# Patient Record
Sex: MALE | Race: WHITE | Employment: UNEMPLOYED | URBAN - NONMETROPOLITAN AREA
[De-identification: names, ages, dates, MRNs, and addresses within clinical notes are randomized per-mention and may not be internally consistent; named-entity substitution may affect disease eponyms.]

---

## 2017-10-31 ENCOUNTER — OFFICE VISIT (OUTPATIENT)
Dept: FAMILY MEDICINE CLINIC | Age: 37
End: 2017-10-31
Payer: MEDICAID

## 2017-10-31 VITALS
HEART RATE: 100 BPM | WEIGHT: 167 LBS | SYSTOLIC BLOOD PRESSURE: 124 MMHG | DIASTOLIC BLOOD PRESSURE: 68 MMHG | BODY MASS INDEX: 24.73 KG/M2 | HEIGHT: 69 IN

## 2017-10-31 DIAGNOSIS — Z72.0 TOBACCO USE: ICD-10-CM

## 2017-10-31 DIAGNOSIS — G43.909 MIGRAINE WITHOUT STATUS MIGRAINOSUS, NOT INTRACTABLE, UNSPECIFIED MIGRAINE TYPE: ICD-10-CM

## 2017-10-31 DIAGNOSIS — Z13.220 SCREENING, LIPID: ICD-10-CM

## 2017-10-31 DIAGNOSIS — R63.4 WEIGHT LOSS, NON-INTENTIONAL: ICD-10-CM

## 2017-10-31 DIAGNOSIS — Z23 NEED FOR PROPHYLACTIC VACCINATION AGAINST STREPTOCOCCUS PNEUMONIAE (PNEUMOCOCCUS): ICD-10-CM

## 2017-10-31 DIAGNOSIS — Z23 NEED FOR PROPHYLACTIC VACCINATION AGAINST DIPHTHERIA-TETANUS-PERTUSSIS (DTP): ICD-10-CM

## 2017-10-31 DIAGNOSIS — F41.9 ANXIETY: Primary | ICD-10-CM

## 2017-10-31 DIAGNOSIS — I10 ESSENTIAL HYPERTENSION: ICD-10-CM

## 2017-10-31 PROCEDURE — 90471 IMMUNIZATION ADMIN: CPT | Performed by: FAMILY MEDICINE

## 2017-10-31 PROCEDURE — 4004F PT TOBACCO SCREEN RCVD TLK: CPT | Performed by: FAMILY MEDICINE

## 2017-10-31 PROCEDURE — G8420 CALC BMI NORM PARAMETERS: HCPCS | Performed by: FAMILY MEDICINE

## 2017-10-31 PROCEDURE — G8427 DOCREV CUR MEDS BY ELIG CLIN: HCPCS | Performed by: FAMILY MEDICINE

## 2017-10-31 PROCEDURE — 90686 IIV4 VACC NO PRSV 0.5 ML IM: CPT | Performed by: FAMILY MEDICINE

## 2017-10-31 PROCEDURE — 99204 OFFICE O/P NEW MOD 45 MIN: CPT | Performed by: FAMILY MEDICINE

## 2017-10-31 PROCEDURE — G8484 FLU IMMUNIZE NO ADMIN: HCPCS | Performed by: FAMILY MEDICINE

## 2017-10-31 RX ORDER — PROPRANOLOL HYDROCHLORIDE 20 MG/1
20 TABLET ORAL 3 TIMES DAILY
COMMUNITY
End: 2017-10-31 | Stop reason: SDUPTHER

## 2017-10-31 RX ORDER — BUSPIRONE HYDROCHLORIDE 15 MG/1
15 TABLET ORAL 2 TIMES DAILY
Qty: 60 TABLET | Refills: 1 | Status: SHIPPED | OUTPATIENT
Start: 2017-10-31 | End: 2017-11-15 | Stop reason: SDUPTHER

## 2017-10-31 RX ORDER — LORAZEPAM 1 MG/1
0.5 TABLET ORAL 3 TIMES DAILY PRN
Qty: 45 TABLET | Refills: 0 | Status: SHIPPED | OUTPATIENT
Start: 2017-10-31 | End: 2017-11-15 | Stop reason: SDUPTHER

## 2017-10-31 RX ORDER — PROPRANOLOL HYDROCHLORIDE 40 MG/1
40 TABLET ORAL 3 TIMES DAILY
Qty: 90 TABLET | Refills: 1 | Status: SHIPPED | OUTPATIENT
Start: 2017-10-31 | End: 2018-01-10 | Stop reason: SDUPTHER

## 2017-10-31 RX ORDER — LORAZEPAM 0.5 MG/1
1 TABLET ORAL
COMMUNITY
End: 2017-10-31 | Stop reason: SDUPTHER

## 2017-10-31 RX ORDER — OMEPRAZOLE 40 MG/1
40 CAPSULE, DELAYED RELEASE ORAL
COMMUNITY
Start: 2016-11-02 | End: 2017-11-15 | Stop reason: ALTCHOICE

## 2017-10-31 RX ORDER — MIRTAZAPINE 15 MG/1
15 TABLET, FILM COATED ORAL NIGHTLY
Qty: 30 TABLET | Refills: 1 | Status: SHIPPED | OUTPATIENT
Start: 2017-10-31 | End: 2017-12-13 | Stop reason: SDUPTHER

## 2017-10-31 ASSESSMENT — ENCOUNTER SYMPTOMS: SHORTNESS OF BREATH: 0

## 2017-10-31 NOTE — PROGRESS NOTES
tablet 0    mirtazapine (REMERON) 15 MG tablet Take 1 tablet by mouth nightly 30 tablet 1    Suvorexant (BELSOMRA) 10 MG TABS Take 10 mg by mouth nightly 30 tablet 1    busPIRone (BUSPAR) 15 MG tablet Take 1 tablet by mouth 2 times daily 60 tablet 1    propranolol (INDERAL) 40 MG tablet Take 1 tablet by mouth 3 times daily 90 tablet 1    omeprazole (PRILOSEC) 40 MG delayed release capsule Take 40 mg by mouth      oxyCODONE-acetaminophen (PERCOCET) 5-325 MG per tablet Take 1 tablet by mouth every 4 hours as needed.  dicyclomine (BENTYL) 10 MG capsule Take 10 mg by mouth 4 times daily (before meals and nightly).  omeprazole (PRILOSEC) 20 MG capsule Take 20 mg by mouth daily. No current facility-administered medications for this visit. Allergies   Allergen Reactions    Ciprofloxacin        Health Maintenance   Topic Date Due    HIV screen  09/27/1995    DTaP/Tdap/Td vaccine (1 - Tdap) 09/27/1999    Pneumococcal med risk (1 of 1 - PPSV23) 09/27/1999    Flu vaccine  Completed       Subjective:      Review of Systems   Constitutional: Positive for unexpected weight change (15 # past few months). Negative for fatigue. Here to establish as new patient. Former physician was Dr. Hamamd Lopez. Has personal and family history of anxiety and depression. Family members also have cardiac issues which is also of concern to him at this time. Respiratory: Negative for shortness of breath. Cardiovascular: Positive for palpitations. Negative for chest pain and leg swelling. Genitourinary: Negative for frequency. Neurological: Negative for dizziness. Psychiatric/Behavioral: Positive for sleep disturbance and suicidal ideas (Not currently but in the past he has had thoughts, never attempts). Negative for hallucinations. The patient is nervous/anxious. racing thoughts when going to sleep.    Nocturia noted more recently    Objective:     /68   Pulse 100   Ht 5' 9\" (1.753 m)   Wt 167 lb (75.8 kg)   BMI 24.66 kg/m²     Physical Exam   Constitutional: He is oriented to person, place, and time. He appears well-developed and well-nourished. No distress. HENT:   Head: Atraumatic. Eyes: Conjunctivae are normal.   Neck: Neck supple. Carotid bruit is not present. No thyromegaly present. Cardiovascular: Regular rhythm. Tachycardia present. No murmur heard. Pulmonary/Chest: Effort normal and breath sounds normal.   Musculoskeletal: He exhibits no edema. Right lower leg: He exhibits no swelling. Left lower leg: He exhibits no swelling. Lymphadenopathy:     He has no cervical adenopathy. Neurological: He is alert and oriented to person, place, and time. Vitals reviewed. extensive counselling on proper medications and options for treatment- pt open to review. Over 45 min spent with over 50% in counselling. Controlled Substances Monitoring:     Attestation: The Prescription Monitoring Report for this patient was reviewed today. (Inés Marie MD)  Documentation: No signs of potential drug abuse or diversion identified. (Inés Marie MD)      Assessment:     1. Anxiety    2. Need for prophylactic vaccination against Streptococcus pneumoniae (pneumococcus)    3. Need for prophylactic vaccination against diphtheria-tetanus-pertussis (DTP)    4. Tobacco use    5. Weight loss, non-intentional    6. Migraine without status migrainosus, not intractable, unspecified migraine type    7. Essential hypertension    8. Screening, lipid      No results found for this visit on 10/31/17.         Plan:     Orders Placed This Encounter   Procedures    INFLUENZA, QUADV, 3 YRS AND OLDER, IM, PF, PREFILL SYR OR SDV, 0.5ML (FLUZONE QUADV, PF)    Creatinine, Serum     Standing Status:   Future     Standing Expiration Date:   10/31/2018    Electrolyte Panel     Standing Status:   Future     Standing Expiration Date:   10/31/2018    Lipid Panel     Standing Status:   Future     Standing Expiration Date:   10/31/2018     Order Specific Question:   Is Patient Fasting?/# of Hours     Answer:   10-12    Microalbumin, Ur     Standing Status:   Future     Standing Expiration Date:   10/31/2018    TSH without Reflex     Standing Status:   Future     Standing Expiration Date:   10/31/2018       Orders Placed This Encounter   Medications    LORazepam (ATIVAN) 1 MG tablet     Sig: Take 0.5 tablets by mouth 3 times daily as needed for Anxiety     Dispense:  45 tablet     Refill:  0    mirtazapine (REMERON) 15 MG tablet     Sig: Take 1 tablet by mouth nightly     Dispense:  30 tablet     Refill:  1    Suvorexant (BELSOMRA) 10 MG TABS     Sig: Take 10 mg by mouth nightly     Dispense:  30 tablet     Refill:  1    busPIRone (BUSPAR) 15 MG tablet     Sig: Take 1 tablet by mouth 2 times daily     Dispense:  60 tablet     Refill:  1    propranolol (INDERAL) 40 MG tablet     Sig: Take 1 tablet by mouth 3 times daily     Dispense:  90 tablet     Refill:  1      Return in about 4 weeks (around 11/28/2017). Discussed use, benefit, and side effects of prescribed medications. All patient questions answered. Pt voiced understanding. Reviewed health maintenance. Instructed to continue current medications, diet and exercise. Patient agreed with treatment plan. Follow up as directed.      Reviewed with pt plan to taper from benzodiazepine scheduled to limited use to symptomatic    Electronically signed by Princess Villatoro MD on 10/31/2017

## 2017-11-15 ENCOUNTER — OFFICE VISIT (OUTPATIENT)
Dept: FAMILY MEDICINE CLINIC | Age: 37
End: 2017-11-15
Payer: MEDICAID

## 2017-11-15 VITALS
SYSTOLIC BLOOD PRESSURE: 120 MMHG | WEIGHT: 171 LBS | BODY MASS INDEX: 25.25 KG/M2 | HEART RATE: 100 BPM | DIASTOLIC BLOOD PRESSURE: 70 MMHG

## 2017-11-15 DIAGNOSIS — Z72.0 TOBACCO USE: ICD-10-CM

## 2017-11-15 DIAGNOSIS — I10 ESSENTIAL HYPERTENSION: ICD-10-CM

## 2017-11-15 DIAGNOSIS — G47.00 INSOMNIA, UNSPECIFIED TYPE: ICD-10-CM

## 2017-11-15 DIAGNOSIS — F41.9 ANXIETY: Primary | ICD-10-CM

## 2017-11-15 PROCEDURE — 99214 OFFICE O/P EST MOD 30 MIN: CPT | Performed by: FAMILY MEDICINE

## 2017-11-15 RX ORDER — LORAZEPAM 1 MG/1
0.5 TABLET ORAL EVERY 6 HOURS PRN
Qty: 60 TABLET | Refills: 0 | Status: SHIPPED | OUTPATIENT
Start: 2017-11-15 | End: 2017-11-22 | Stop reason: SDUPTHER

## 2017-11-15 RX ORDER — ESZOPICLONE 1 MG/1
1 TABLET, FILM COATED ORAL NIGHTLY
Qty: 30 TABLET | Refills: 0 | Status: SHIPPED | OUTPATIENT
Start: 2017-11-15 | End: 2017-12-13 | Stop reason: SDUPTHER

## 2017-11-15 RX ORDER — BUSPIRONE HYDROCHLORIDE 30 MG/1
30 TABLET ORAL 2 TIMES DAILY
Qty: 60 TABLET | Refills: 2 | Status: SHIPPED | OUTPATIENT
Start: 2017-11-15 | End: 2017-12-13

## 2017-11-15 NOTE — PATIENT INSTRUCTIONS
Hot compresses to nose 10 minutes 3 times daily. Avoid irritating or picking at nose lesion for at least 2 weeks.

## 2017-11-15 NOTE — PROGRESS NOTES
[facility]  [address]  Dept: [phone]  Dept Fax: [phone]      Jonathan Redman is a 40 y.o. male who presents today for his medical conditions/complaints as noted below. Jonathan Redman is c/o of Anxiety            HPI:     HPI   Pt back for follow up,   Was unable to get belsomra through insurance. Remeron and medications were doing ok though when attempting to stop ativan noted sweats and not feeling as well. Had been on 1 mg 3 times daily then dropped to 1/2 3 times daily and noting need for some extra doses  Not noting improvement with buspar per pt. Also complaints of right nostril irritated for past couple weeks  Had prior taken Diane Lanes - stopped when children smaller.      No abdominal pains noted just intermittant \"nervous stomach\"    BP Readings from Last 3 Encounters:   11/15/17 120/70   10/31/17 124/68   08/14/15 125/83          (goal 120/80)    Past Medical History:   Diagnosis Date    Anxiety     Has been hospitalized in past    Chronic back pain     Depression     Has been hospitalized in past    Irritable bowel syndrome       Past Surgical History:   Procedure Laterality Date    APPENDECTOMY      HERNIA REPAIR      TONSILLECTOMY         Family History   Problem Relation Age of Onset    Other Mother      Anxiety/Depression    Heart Attack Father     Heart Attack Maternal Uncle     Heart Attack Paternal Uncle     Other Maternal Uncle      Suicide    Other Paternal Uncle      Suicide       Social History   Substance Use Topics    Smoking status: Current Every Day Smoker     Packs/day: 1.50     Types: Cigarettes    Smokeless tobacco: Former User     Types: Chew    Alcohol use Yes      Comment: socailly      Current Outpatient Prescriptions   Medication Sig Dispense Refill    busPIRone (BUSPAR) 30 MG tablet Take 1 tablet by mouth 2 times daily 60 tablet 2    eszopiclone (LUNESTA) 1 MG TABS Take 1 tablet by mouth nightly . 30 tablet 0    LORazepam (ATIVAN) 1 MG tablet Take 0.5 tablets by mouth every 6 hours as needed for Anxiety . 60 tablet 0    mirtazapine (REMERON) 15 MG tablet Take 1 tablet by mouth nightly 30 tablet 1    propranolol (INDERAL) 40 MG tablet Take 1 tablet by mouth 3 times daily 90 tablet 1     No current facility-administered medications for this visit. Allergies   Allergen Reactions    Ciprofloxacin        Health Maintenance   Topic Date Due    HIV screen  09/27/1995    DTaP/Tdap/Td vaccine (1 - Tdap) 09/27/1999    Pneumococcal med risk (1 of 1 - PPSV23) 09/27/1999    Flu vaccine  Completed       Subjective:      Review of Systems   Constitutional: Positive for activity change (decreasing) and appetite change (decreasing). Negative for unexpected weight change. Psychiatric/Behavioral: Positive for decreased concentration and sleep disturbance (insurance didn't cover Belsomra,issues falling and staying asleep. Remrom was helpful to sleep until Ativan dose was decreased). The patient is nervous/anxious (alot of panic attacks). Mood:  Anxious, buspar not helping much, ativan lower dose isn't helping  Suicidal thoughts? no  Previous Psychiatrist/Counseling? no    Objective:     /70   Pulse 100   Wt 171 lb (77.6 kg)   BMI 25.25 kg/m²   Physical Exam   Constitutional: He is oriented to person, place, and time. HENT:   Nose: No mucosal edema. Right nare with 3 mm mild hyperkeratosis anteriorly inner aspect without bleeding   Cardiovascular: Normal rate and regular rhythm. No murmur heard. Pulmonary/Chest: Effort normal and breath sounds normal. He has no wheezes. Neurological: He is alert and oriented to person, place, and time. Psychiatric: His speech is normal. His mood appears anxious (mildly with good eye contact). Assessment:      1. Anxiety    2. Insomnia, unspecified type    3. Tobacco use    4.  Essential hypertension Plan:     Patient Instructions   Hot compresses to nose 10 minutes 3 times daily. Avoid irritating or picking at nose lesion for at least 2 weeks. No orders of the defined types were placed in this encounter. Orders Placed This Encounter   Medications    busPIRone (BUSPAR) 30 MG tablet     Sig: Take 1 tablet by mouth 2 times daily     Dispense:  60 tablet     Refill:  2    eszopiclone (LUNESTA) 1 MG TABS     Sig: Take 1 tablet by mouth nightly . Dispense:  30 tablet     Refill:  0    LORazepam (ATIVAN) 1 MG tablet     Sig: Take 0.5 tablets by mouth every 6 hours as needed for Anxiety . Dispense:  60 tablet     Refill:  0    Reviewed if not able to improve and control well may consider return to psychiatry if desired. Return in about 4 weeks (around 12/13/2017) for anxiety, insomnia may push back prior appointment. Reviewed bumping busparone and edge up ativan to 4 times daily          Discussed use, benefit, and side effects of prescribed medications. All patient questions answered. Pt voiced understanding. Reviewed health maintenance. Instructed to continue current medications, diet and exercise. Patient agreed with treatment plan. Follow up as directed.      Electronically signed by Quentin Germain MD on 11/15/2017

## 2017-11-22 RX ORDER — LORAZEPAM 1 MG/1
0.5 TABLET ORAL EVERY 6 HOURS PRN
Qty: 60 TABLET | Refills: 0 | Status: SHIPPED | OUTPATIENT
Start: 2017-11-22 | End: 2017-12-13 | Stop reason: SDUPTHER

## 2017-12-09 ENCOUNTER — HOSPITAL ENCOUNTER (EMERGENCY)
Age: 37
Discharge: HOME | End: 2017-12-09
Attending: EMERGENCY MEDICINE
Payer: MEDICARE

## 2017-12-09 VITALS
OXYGEN SATURATION: 100 % | SYSTOLIC BLOOD PRESSURE: 132 MMHG | BODY MASS INDEX: 25.2 KG/M2 | HEART RATE: 80 BPM | DIASTOLIC BLOOD PRESSURE: 80 MMHG | WEIGHT: 180 LBS | HEIGHT: 71 IN | TEMPERATURE: 98.1 F | RESPIRATION RATE: 12 BRPM

## 2017-12-09 DIAGNOSIS — K08.89 PAIN, DENTAL: Primary | ICD-10-CM

## 2017-12-09 PROCEDURE — 2500000003 HC RX 250 WO HCPCS

## 2017-12-09 PROCEDURE — 99282 EMERGENCY DEPT VISIT SF MDM: CPT

## 2017-12-09 PROCEDURE — 6370000000 HC RX 637 (ALT 250 FOR IP): Performed by: EMERGENCY MEDICINE

## 2017-12-09 PROCEDURE — 64400 NJX AA&/STRD TRIGEMINAL NRV: CPT

## 2017-12-09 RX ORDER — BUPIVACAINE HYDROCHLORIDE AND EPINEPHRINE 5; 5 MG/ML; UG/ML
10 INJECTION, SOLUTION PERINEURAL ONCE
Status: DISCONTINUED | OUTPATIENT
Start: 2017-12-09 | End: 2017-12-10 | Stop reason: HOSPADM

## 2017-12-09 RX ORDER — PENICILLIN V POTASSIUM 250 MG/1
500 TABLET ORAL ONCE
Status: COMPLETED | OUTPATIENT
Start: 2017-12-09 | End: 2017-12-09

## 2017-12-09 RX ORDER — BUPIVACAINE HYDROCHLORIDE AND EPINEPHRINE 5; 5 MG/ML; UG/ML
INJECTION, SOLUTION EPIDURAL; INTRACAUDAL; PERINEURAL
Status: COMPLETED
Start: 2017-12-09 | End: 2017-12-09

## 2017-12-09 RX ORDER — IBUPROFEN 600 MG/1
600 TABLET ORAL EVERY 6 HOURS PRN
Qty: 30 TABLET | Refills: 0 | Status: SHIPPED | OUTPATIENT
Start: 2017-12-09 | End: 2021-10-18

## 2017-12-09 RX ORDER — PENICILLIN V POTASSIUM 500 MG/1
500 TABLET ORAL 4 TIMES DAILY
Qty: 28 TABLET | Refills: 0 | Status: SHIPPED | OUTPATIENT
Start: 2017-12-09 | End: 2017-12-16

## 2017-12-09 RX ORDER — IBUPROFEN 800 MG/1
800 TABLET ORAL ONCE
Status: COMPLETED | OUTPATIENT
Start: 2017-12-09 | End: 2017-12-09

## 2017-12-09 RX ADMIN — BUPIVACAINE HYDROCHLORIDE AND EPINEPHRINE BITARTRATE 30 ML: 5; .005 INJECTION, SOLUTION EPIDURAL; INTRACAUDAL; PERINEURAL at 22:28

## 2017-12-09 RX ADMIN — PENICILLIN V POTASIUM 500 MG: 250 TABLET ORAL at 22:25

## 2017-12-09 RX ADMIN — IBUPROFEN 800 MG: 800 TABLET, FILM COATED ORAL at 22:25

## 2017-12-09 ASSESSMENT — PAIN DESCRIPTION - LOCATION: LOCATION: MOUTH

## 2017-12-09 ASSESSMENT — PAIN DESCRIPTION - PAIN TYPE: TYPE: ACUTE PAIN

## 2017-12-09 ASSESSMENT — PAIN SCALES - GENERAL
PAINLEVEL_OUTOF10: 9
PAINLEVEL_OUTOF10: 9
PAINLEVEL_OUTOF10: 10

## 2017-12-10 NOTE — ED PROVIDER NOTES
paternal uncle. SOCIAL HISTORY      reports that he has been smoking Cigarettes. He has been smoking about 0.50 packs per day. He has quit using smokeless tobacco. His smokeless tobacco use included Chew. He reports that he does not drink alcohol or use drugs. PHYSICAL EXAM     INITIAL VITALS:  height is 5' 11\" (1.803 m) and weight is 180 lb (81.6 kg). His tympanic temperature is 98.1 °F (36.7 °C). His pulse is 80. His respiration is 12 and oxygen saturation is 100%. Gen.: Patient is alert. No apparent distress. HEENT: Head is atraumatic. Conjunctiva are clear. Face shows no erythema or warmth or swelling. Mouth shows moist mucous membranes. Patient has several caps to the area. Has several areas worse. Teeth are gone either surgically or because of chronic problems. He has 2 areas, one on the top upper left is decayed down to the gumline, which is what he said was fractured today. Has another area on the right side, which is also decayed down to the gumline has no swelling to the area. No trismus. Neck: Supple. No meningismus. Respiratory: Lung sounds are clear bilateral without wheezes or rhonchi.   Cardiac: Heart is regular rate and rhythm    DIFFERENTIAL DIAGNOSIS/ MDM:     Dental pain, cough    DIAGNOSTIC RESULTS         EMERGENCY DEPARTMENT COURSE:   Vitals:    Vitals:    12/09/17 2152   Pulse: 80   Resp: 12   Temp: 98.1 °F (36.7 °C)   TempSrc: Tympanic   SpO2: 100%   Weight: 180 lb (81.6 kg)   Height: 5' 11\" (1.803 m)     -------------------------   , Temp: 98.1 °F (36.7 °C), Pulse: 80, Resp: 12    Orders Placed This Encounter   Medications    bupivacaine-EPINEPHrine (MARCAINE-w/EPINEPHRINE) 0.5% -1:730099 injection 10 mL    bupivacaine-EPINEPHrine PF (MARCAINE-w/EPINEPHRINE) 0.5% -1:066050 injection     CARRINGTON VASQUEZ: cabinet override    penicillin v potassium (VEETID) 500 MG tablet     Sig: Take 1 tablet by mouth 4 times daily for 7 days     Dispense:  28 tablet     Refill: 0    penicillin v potassium (VEETID) tablet 500 mg    ibuprofen (ADVIL;MOTRIN) 600 MG tablet     Sig: Take 1 tablet by mouth every 6 hours as needed for Pain     Dispense:  30 tablet     Refill:  0    ibuprofen (ADVIL;MOTRIN) tablet 800 mg           Re-evaluation Notes    Patient is requesting numbing medicine to the area. 1/2 mL of 1/2% Marcaine with epinephrine was instilled around the base of the tooth. He will be discharged with prescriptions for penicillin, Motrin, follow-up with his dentist return if worse    CRITICAL CARE:   None      CONSULTS:      PROCEDURES:  None    FINAL IMPRESSION      1. Pain, dental          DISPOSITION/PLAN   DISPOSITION Decision to Discharge    Condition on Disposition    Stable    PATIENT REFERRED TO:  No follow-up provider specified. DISCHARGE MEDICATIONS:  New Prescriptions    IBUPROFEN (ADVIL;MOTRIN) 600 MG TABLET    Take 1 tablet by mouth every 6 hours as needed for Pain    PENICILLIN V POTASSIUM (VEETID) 500 MG TABLET    Take 1 tablet by mouth 4 times daily for 7 days       (Please note that portions of this note were completed with a voice recognition program.  Efforts were made to edit the dictations but occasionally words are mis-transcribed.)    Wood MD, F.A.C.E.P.   Attending Emergency Physician        Fely Deluca MD  12/09/17 [id number]

## 2017-12-11 ENCOUNTER — TELEPHONE (OUTPATIENT)
Dept: FAMILY MEDICINE CLINIC | Age: 37
End: 2017-12-11

## 2017-12-13 ENCOUNTER — OFFICE VISIT (OUTPATIENT)
Dept: FAMILY MEDICINE CLINIC | Age: 37
End: 2017-12-13
Payer: MEDICARE

## 2017-12-13 VITALS
HEART RATE: 100 BPM | BODY MASS INDEX: 23.43 KG/M2 | WEIGHT: 168 LBS | DIASTOLIC BLOOD PRESSURE: 64 MMHG | SYSTOLIC BLOOD PRESSURE: 110 MMHG

## 2017-12-13 DIAGNOSIS — Z72.0 TOBACCO USE: ICD-10-CM

## 2017-12-13 DIAGNOSIS — F41.9 ANXIETY: Primary | ICD-10-CM

## 2017-12-13 DIAGNOSIS — G47.00 INSOMNIA, UNSPECIFIED TYPE: ICD-10-CM

## 2017-12-13 DIAGNOSIS — I10 ESSENTIAL HYPERTENSION: ICD-10-CM

## 2017-12-13 PROCEDURE — G8427 DOCREV CUR MEDS BY ELIG CLIN: HCPCS | Performed by: FAMILY MEDICINE

## 2017-12-13 PROCEDURE — G8420 CALC BMI NORM PARAMETERS: HCPCS | Performed by: FAMILY MEDICINE

## 2017-12-13 PROCEDURE — 4004F PT TOBACCO SCREEN RCVD TLK: CPT | Performed by: FAMILY MEDICINE

## 2017-12-13 PROCEDURE — G8484 FLU IMMUNIZE NO ADMIN: HCPCS | Performed by: FAMILY MEDICINE

## 2017-12-13 PROCEDURE — 99214 OFFICE O/P EST MOD 30 MIN: CPT | Performed by: FAMILY MEDICINE

## 2017-12-13 RX ORDER — ESZOPICLONE 1 MG/1
1 TABLET, FILM COATED ORAL NIGHTLY
Qty: 30 TABLET | Refills: 1 | Status: SHIPPED | OUTPATIENT
Start: 2017-12-13 | End: 2018-02-13 | Stop reason: SDUPTHER

## 2017-12-13 RX ORDER — MIRTAZAPINE 30 MG/1
30 TABLET, FILM COATED ORAL NIGHTLY
Qty: 30 TABLET | Refills: 3 | Status: SHIPPED | OUTPATIENT
Start: 2017-12-13 | End: 2018-02-12 | Stop reason: SDUPTHER

## 2017-12-13 RX ORDER — LORAZEPAM 1 MG/1
1 TABLET ORAL EVERY 8 HOURS PRN
Qty: 90 TABLET | Refills: 0 | Status: SHIPPED | OUTPATIENT
Start: 2017-12-13 | End: 2018-01-04 | Stop reason: SDUPTHER

## 2017-12-13 NOTE — TELEPHONE ENCOUNTER
Reviewed at follow up appointment today.  Will not alternate benzo scripts with pt should be decreasing use

## 2018-01-04 DIAGNOSIS — F41.9 ANXIETY: Primary | ICD-10-CM

## 2018-01-04 RX ORDER — LORAZEPAM 1 MG/1
1 TABLET ORAL EVERY 8 HOURS PRN
Qty: 90 TABLET | Refills: 0 | Status: SHIPPED | OUTPATIENT
Start: 2018-01-04 | End: 2018-02-03

## 2018-01-04 RX ORDER — LORAZEPAM 1 MG/1
1 TABLET ORAL EVERY 8 HOURS PRN
Qty: 90 TABLET | Refills: 0 | OUTPATIENT
Start: 2018-01-04

## 2018-01-10 RX ORDER — PROPRANOLOL HYDROCHLORIDE 40 MG/1
TABLET ORAL
Qty: 30 TABLET | Refills: 0 | Status: SHIPPED | OUTPATIENT
Start: 2018-01-10 | End: 2018-01-14 | Stop reason: SDUPTHER

## 2018-01-10 NOTE — TELEPHONE ENCOUNTER
[facility] is calling to request a refill on the following medication(s):  Requested Prescriptions     Pending Prescriptions Disp Refills    propranolol (INDERAL) 40 MG tablet [Pharmacy Med Name: PROPRANOLOL 40 MG TABLET] 30 tablet 0     Sig: take 1 tablet by mouth three times a day       Last Visit Date (If Applicable):  23/92/1858    Next Visit Date:    2/12/2018

## 2018-01-14 RX ORDER — PROPRANOLOL HYDROCHLORIDE 40 MG/1
TABLET ORAL
Qty: 90 TABLET | Refills: 1 | Status: SHIPPED | OUTPATIENT
Start: 2018-01-14 | End: 2018-04-10 | Stop reason: SDUPTHER

## 2018-02-12 ENCOUNTER — OFFICE VISIT (OUTPATIENT)
Dept: FAMILY MEDICINE CLINIC | Age: 38
End: 2018-02-12
Payer: MEDICARE

## 2018-02-12 VITALS
WEIGHT: 176 LBS | HEART RATE: 100 BPM | DIASTOLIC BLOOD PRESSURE: 80 MMHG | SYSTOLIC BLOOD PRESSURE: 190 MMHG | BODY MASS INDEX: 24.55 KG/M2

## 2018-02-12 DIAGNOSIS — Z72.0 TOBACCO USE: ICD-10-CM

## 2018-02-12 DIAGNOSIS — F41.9 ANXIETY: Primary | ICD-10-CM

## 2018-02-12 DIAGNOSIS — E86.0 DEHYDRATION: ICD-10-CM

## 2018-02-12 DIAGNOSIS — G47.09 OTHER INSOMNIA: ICD-10-CM

## 2018-02-12 DIAGNOSIS — A08.4 STOMACH FLU: ICD-10-CM

## 2018-02-12 PROCEDURE — G8484 FLU IMMUNIZE NO ADMIN: HCPCS | Performed by: FAMILY MEDICINE

## 2018-02-12 PROCEDURE — G8420 CALC BMI NORM PARAMETERS: HCPCS | Performed by: FAMILY MEDICINE

## 2018-02-12 PROCEDURE — G8427 DOCREV CUR MEDS BY ELIG CLIN: HCPCS | Performed by: FAMILY MEDICINE

## 2018-02-12 PROCEDURE — 4004F PT TOBACCO SCREEN RCVD TLK: CPT | Performed by: FAMILY MEDICINE

## 2018-02-12 PROCEDURE — 99214 OFFICE O/P EST MOD 30 MIN: CPT | Performed by: FAMILY MEDICINE

## 2018-02-12 RX ORDER — LORAZEPAM 1 MG/1
1 TABLET ORAL 3 TIMES DAILY
COMMUNITY
End: 2018-02-12 | Stop reason: SDUPTHER

## 2018-02-12 RX ORDER — LORAZEPAM 1 MG/1
TABLET ORAL
Qty: 60 TABLET | Refills: 0 | Status: SHIPPED | OUTPATIENT
Start: 2018-02-12 | End: 2018-03-09 | Stop reason: SDUPTHER

## 2018-02-12 RX ORDER — MIRTAZAPINE 45 MG/1
45 TABLET, FILM COATED ORAL NIGHTLY
Qty: 30 TABLET | Refills: 2 | Status: SHIPPED | OUTPATIENT
Start: 2018-02-12 | End: 2018-04-10 | Stop reason: SDUPTHER

## 2018-02-12 ASSESSMENT — ENCOUNTER SYMPTOMS
DIARRHEA: 1
COUGH: 1
SORE THROAT: 0

## 2018-02-12 NOTE — PROGRESS NOTES
Take 1 tablet by mouth nightly . 30 tablet 1    ibuprofen (ADVIL;MOTRIN) 600 MG tablet Take 1 tablet by mouth every 6 hours as needed for Pain 30 tablet 0     No current facility-administered medications for this visit. Allergies   Allergen Reactions    Ciprofloxacin        Health Maintenance   Topic Date Due    Potassium monitoring  1980    Creatinine monitoring  1980    HIV screen  09/27/1995    DTaP/Tdap/Td vaccine (1 - Tdap) 09/27/1999    Pneumococcal med risk (1 of 1 - PPSV23) 09/27/1999    Flu vaccine  Completed       Subjective:      Review of Systems   Constitutional: Negative for activity change, appetite change and unexpected weight change. HENT: Positive for congestion. Negative for sore throat. Has had URI since Thursday pm/friday am   Respiratory: Positive for cough. Gastrointestinal: Positive for diarrhea (since Thursday pm). Neurological: Positive for headaches. Psychiatric/Behavioral: Negative for decreased concentration and sleep disturbance (lunesta and remeron help). The patient is not nervous/anxious (panic attacks daily, says he is taking ativan tid and is helpful). Has been to see psychiatry per pt for assessment, awaiting appointment to actually see someone. Had mild hemorrhoid bleeding per pt while ill    Mood:  Still anxious, no worse no better  Suicidal thoughts? no  Previous Psychiatrist/Counseling? no    Objective:     BP (!) 190/80   Pulse 100   Wt 176 lb (79.8 kg)   BMI 24.55 kg/m²   Physical Exam   Constitutional: He is oriented to person, place, and time. He appears well-developed and well-nourished. No distress. HENT:   Head: Atraumatic. Eyes: Conjunctivae are normal.   Neck: Neck supple. Carotid bruit is not present. No thyromegaly present. Cardiovascular: Normal rate and regular rhythm. No murmur heard. Pulmonary/Chest: Effort normal and breath sounds normal.   Abdominal: Soft.  Bowel sounds are normal. Tenderness: slight epigastric. Musculoskeletal: He exhibits no edema. Right lower leg: He exhibits no swelling. Left lower leg: He exhibits no swelling. Lymphadenopathy:     He has no cervical adenopathy. Neurological: He is alert and oriented to person, place, and time. Pt less anxious than usual         Assessment:      1. Anxiety    2. Other insomnia    3. Stomach flu    4. Dehydration    5. Tobacco use                     Plan:     Patient Instructions   Increase fluids except coffee, tea and alcohol. May use tylenol every 4-6 hours or as needed for comfort. No orders of the defined types were placed in this encounter. Orders Placed This Encounter   Medications    mirtazapine (REMERON) 45 MG tablet     Sig: Take 1 tablet by mouth nightly     Dispense:  30 tablet     Refill:  2    LORazepam (ATIVAN) 1 MG tablet     Si/2-1 tab up to 4 times daily, max dose 2 mg /day. .     Dispense:  60 tablet     Refill:  0        Return in about 3 months (around 2018) for anxiety. Reviewed stopping smoking while ill. Encouraged to stop smoking     Attempting to lower dependence on lorazepam to control sx dropping to 2 mg total dose and monitor. Discussed use, benefit, and side effects of prescribed medications. All patient questions answered. Pt voiced understanding. Instructed to continue current medications, diet and exercise. Patient agreed with treatment plan. Follow up as directed.      Electronically signed by Del Luna MD on 2018

## 2018-03-09 NOTE — TELEPHONE ENCOUNTER
Devin Alejandre is calling to request a refill on the following medication(s):  Requested Prescriptions     Pending Prescriptions Disp Refills    eszopiclone (LUNESTA) 1 MG TABS 30 tablet 5    LORazepam (ATIVAN) 1 MG tablet 60 tablet 0     Si/2-1 tab up to 4 times daily, max dose 2 mg /day. .       Last Visit Date (If Applicable):  Visit date not found    Next Visit Date:    Visit date not found

## 2018-03-12 RX ORDER — LORAZEPAM 1 MG/1
TABLET ORAL
Qty: 60 TABLET | Refills: 0 | Status: SHIPPED | OUTPATIENT
Start: 2018-03-12 | End: 2018-04-19 | Stop reason: SDUPTHER

## 2018-03-12 RX ORDER — ESZOPICLONE 1 MG/1
TABLET, FILM COATED ORAL
Qty: 30 TABLET | Refills: 5 | Status: SHIPPED | OUTPATIENT
Start: 2018-03-12 | End: 2018-05-16

## 2018-04-10 RX ORDER — MIRTAZAPINE 45 MG/1
45 TABLET, FILM COATED ORAL NIGHTLY
Qty: 30 TABLET | Refills: 2 | Status: SHIPPED | OUTPATIENT
Start: 2018-04-10 | End: 2018-06-25 | Stop reason: SDUPTHER

## 2018-04-10 RX ORDER — LORAZEPAM 1 MG/1
TABLET ORAL
Qty: 60 TABLET | Refills: 0 | OUTPATIENT
Start: 2018-04-10 | End: 2018-05-05

## 2018-04-10 RX ORDER — PROPRANOLOL HYDROCHLORIDE 40 MG/1
TABLET ORAL
Qty: 90 TABLET | Refills: 2 | Status: SHIPPED | OUTPATIENT
Start: 2018-04-10 | End: 2018-06-27 | Stop reason: SDUPTHER

## 2018-04-16 ENCOUNTER — TELEPHONE (OUTPATIENT)
Dept: FAMILY MEDICINE CLINIC | Age: 38
End: 2018-04-16

## 2018-04-19 RX ORDER — LORAZEPAM 1 MG/1
TABLET ORAL
Qty: 60 TABLET | Refills: 0 | Status: SHIPPED | OUTPATIENT
Start: 2018-04-19 | End: 2018-05-14

## 2018-05-10 ENCOUNTER — OFFICE VISIT (OUTPATIENT)
Dept: FAMILY MEDICINE CLINIC | Age: 38
End: 2018-05-10
Payer: MEDICARE

## 2018-05-10 VITALS
WEIGHT: 176 LBS | BODY MASS INDEX: 24.64 KG/M2 | DIASTOLIC BLOOD PRESSURE: 60 MMHG | HEIGHT: 71 IN | SYSTOLIC BLOOD PRESSURE: 118 MMHG | HEART RATE: 64 BPM

## 2018-05-10 DIAGNOSIS — F41.9 ANXIETY: Primary | ICD-10-CM

## 2018-05-10 DIAGNOSIS — Z72.0 TOBACCO USE: ICD-10-CM

## 2018-05-10 DIAGNOSIS — G47.09 OTHER INSOMNIA: ICD-10-CM

## 2018-05-10 PROCEDURE — 99214 OFFICE O/P EST MOD 30 MIN: CPT | Performed by: FAMILY MEDICINE

## 2018-05-10 PROCEDURE — G8427 DOCREV CUR MEDS BY ELIG CLIN: HCPCS | Performed by: FAMILY MEDICINE

## 2018-05-10 PROCEDURE — 4004F PT TOBACCO SCREEN RCVD TLK: CPT | Performed by: FAMILY MEDICINE

## 2018-05-10 PROCEDURE — G8420 CALC BMI NORM PARAMETERS: HCPCS | Performed by: FAMILY MEDICINE

## 2018-05-10 RX ORDER — AZELASTINE HYDROCHLORIDE, FLUTICASONE PROPIONATE 137; 50 UG/1; UG/1
2 SPRAY, METERED NASAL DAILY
Qty: 1 BOTTLE | Refills: 3 | Status: SHIPPED | OUTPATIENT
Start: 2018-05-10 | End: 2018-05-16 | Stop reason: ALTCHOICE

## 2018-05-10 RX ORDER — FEXOFENADINE HCL 180 MG/1
180 TABLET ORAL DAILY
Qty: 30 TABLET | Refills: 5 | Status: SHIPPED | OUTPATIENT
Start: 2018-05-10 | End: 2018-10-22 | Stop reason: SDUPTHER

## 2018-05-10 RX ORDER — AMOXICILLIN 500 MG/1
CAPSULE ORAL
COMMUNITY
Start: 2018-05-04 | End: 2018-05-31 | Stop reason: ALTCHOICE

## 2018-05-14 ENCOUNTER — TELEPHONE (OUTPATIENT)
Dept: FAMILY MEDICINE CLINIC | Age: 38
End: 2018-05-14

## 2018-05-14 DIAGNOSIS — J30.9 CHRONIC ALLERGIC RHINITIS, UNSPECIFIED SEASONALITY, UNSPECIFIED TRIGGER: Primary | ICD-10-CM

## 2018-05-16 ENCOUNTER — TELEPHONE (OUTPATIENT)
Dept: FAMILY MEDICINE CLINIC | Age: 38
End: 2018-05-16

## 2018-05-16 DIAGNOSIS — F19.11 H/O: SUBSTANCE ABUSE (HCC): Primary | ICD-10-CM

## 2018-05-16 RX ORDER — BUPRENORPHINE HYDROCHLORIDE, NALOXONE HYDROCHLORIDE 8; 2 MG/1; MG/1
FILM, SOLUBLE BUCCAL; SUBLINGUAL
COMMUNITY
Start: 2018-04-25 | End: 2021-12-21 | Stop reason: ALTCHOICE

## 2018-05-16 RX ORDER — AZELASTINE 1 MG/ML
2 SPRAY, METERED NASAL 2 TIMES DAILY
Qty: 1 BOTTLE | Refills: 3 | Status: SHIPPED | OUTPATIENT
Start: 2018-05-16 | End: 2018-05-31 | Stop reason: ALTCHOICE

## 2018-05-16 RX ORDER — MOMETASONE FUROATE 50 UG/1
2 SPRAY, METERED NASAL DAILY
Qty: 1 INHALER | Refills: 3 | Status: SHIPPED | OUTPATIENT
Start: 2018-05-16 | End: 2018-05-17 | Stop reason: RX

## 2018-05-31 ENCOUNTER — TELEPHONE (OUTPATIENT)
Dept: FAMILY MEDICINE CLINIC | Age: 38
End: 2018-05-31

## 2018-05-31 ENCOUNTER — OFFICE VISIT (OUTPATIENT)
Dept: FAMILY MEDICINE CLINIC | Age: 38
End: 2018-05-31
Payer: MEDICARE

## 2018-05-31 VITALS
BODY MASS INDEX: 23.1 KG/M2 | WEIGHT: 165 LBS | SYSTOLIC BLOOD PRESSURE: 120 MMHG | HEART RATE: 80 BPM | DIASTOLIC BLOOD PRESSURE: 60 MMHG | HEIGHT: 71 IN

## 2018-05-31 DIAGNOSIS — R06.2 WHEEZING: ICD-10-CM

## 2018-05-31 DIAGNOSIS — I10 ESSENTIAL HYPERTENSION: ICD-10-CM

## 2018-05-31 DIAGNOSIS — Z72.0 TOBACCO USE: ICD-10-CM

## 2018-05-31 DIAGNOSIS — F41.9 ANXIETY: Primary | ICD-10-CM

## 2018-05-31 PROCEDURE — G8420 CALC BMI NORM PARAMETERS: HCPCS | Performed by: FAMILY MEDICINE

## 2018-05-31 PROCEDURE — 99214 OFFICE O/P EST MOD 30 MIN: CPT | Performed by: FAMILY MEDICINE

## 2018-05-31 PROCEDURE — 4004F PT TOBACCO SCREEN RCVD TLK: CPT | Performed by: FAMILY MEDICINE

## 2018-05-31 PROCEDURE — G8427 DOCREV CUR MEDS BY ELIG CLIN: HCPCS | Performed by: FAMILY MEDICINE

## 2018-05-31 RX ORDER — ESZOPICLONE 1 MG/1
1 TABLET, FILM COATED ORAL NIGHTLY
COMMUNITY
End: 2018-05-31 | Stop reason: ALTCHOICE

## 2018-05-31 RX ORDER — BUDESONIDE AND FORMOTEROL FUMARATE DIHYDRATE 160; 4.5 UG/1; UG/1
2 AEROSOL RESPIRATORY (INHALATION) 2 TIMES DAILY
Qty: 1 INHALER | Refills: 3 | Status: SHIPPED | OUTPATIENT
Start: 2018-05-31 | End: 2020-07-09

## 2018-05-31 RX ORDER — LORAZEPAM 0.5 MG/1
0.5 TABLET ORAL EVERY 6 HOURS PRN
COMMUNITY
End: 2018-05-31 | Stop reason: ALTCHOICE

## 2018-05-31 ASSESSMENT — PATIENT HEALTH QUESTIONNAIRE - PHQ9
SUM OF ALL RESPONSES TO PHQ9 QUESTIONS 1 & 2: 2
1. LITTLE INTEREST OR PLEASURE IN DOING THINGS: 1
2. FEELING DOWN, DEPRESSED OR HOPELESS: 1
SUM OF ALL RESPONSES TO PHQ QUESTIONS 1-9: 2

## 2018-05-31 ASSESSMENT — ENCOUNTER SYMPTOMS
CHOKING: 0
COUGH: 1
WHEEZING: 1
SINUS PAIN: 1
SHORTNESS OF BREATH: 1
CHEST TIGHTNESS: 1
SINUS PRESSURE: 1

## 2018-06-06 RX ORDER — HYDROXYZINE 50 MG/1
TABLET, FILM COATED ORAL
COMMUNITY
Start: 2018-05-31 | End: 2020-07-09

## 2018-06-06 RX ORDER — ALBUTEROL SULFATE 90 UG/1
2 AEROSOL, METERED RESPIRATORY (INHALATION) EVERY 4 HOURS PRN
COMMUNITY
End: 2020-07-09

## 2018-06-26 RX ORDER — MIRTAZAPINE 45 MG/1
TABLET, FILM COATED ORAL
Qty: 30 TABLET | Refills: 2 | Status: SHIPPED | OUTPATIENT
Start: 2018-06-26 | End: 2020-07-09

## 2018-06-27 RX ORDER — PROPRANOLOL HYDROCHLORIDE 40 MG/1
TABLET ORAL
Qty: 90 TABLET | Refills: 2 | Status: SHIPPED | OUTPATIENT
Start: 2018-06-27 | End: 2018-11-11 | Stop reason: SDUPTHER

## 2018-10-03 NOTE — TELEPHONE ENCOUNTER
Jelena Ramon has not yet called back and no further refill request for this has been sent to our office, this encounter will be closed at this time.

## 2018-10-22 RX ORDER — FEXOFENADINE HCL 180 MG
TABLET ORAL
Qty: 30 TABLET | Refills: 5 | Status: SHIPPED | OUTPATIENT
Start: 2018-10-22

## 2018-11-12 RX ORDER — PROPRANOLOL HYDROCHLORIDE 40 MG/1
TABLET ORAL
Qty: 90 TABLET | Refills: 0 | Status: SHIPPED | OUTPATIENT
Start: 2018-11-12 | End: 2020-07-09

## 2018-12-12 RX ORDER — PROPRANOLOL HYDROCHLORIDE 40 MG/1
TABLET ORAL
Qty: 90 TABLET | Refills: 0 | OUTPATIENT
Start: 2018-12-12

## 2019-09-23 ENCOUNTER — TELEPHONE (OUTPATIENT)
Dept: FAMILY MEDICINE CLINIC | Age: 39
End: 2019-09-23

## 2020-07-09 ENCOUNTER — VIRTUAL VISIT (OUTPATIENT)
Dept: FAMILY MEDICINE CLINIC | Age: 40
End: 2020-07-09
Payer: MEDICARE

## 2020-07-09 PROCEDURE — G8431 POS CLIN DEPRES SCRN F/U DOC: HCPCS | Performed by: PHYSICIAN ASSISTANT

## 2020-07-09 PROCEDURE — 96160 PT-FOCUSED HLTH RISK ASSMT: CPT | Performed by: PHYSICIAN ASSISTANT

## 2020-07-09 PROCEDURE — 99204 OFFICE O/P NEW MOD 45 MIN: CPT | Performed by: PHYSICIAN ASSISTANT

## 2020-07-09 PROCEDURE — 4004F PT TOBACCO SCREEN RCVD TLK: CPT | Performed by: PHYSICIAN ASSISTANT

## 2020-07-09 PROCEDURE — G8421 BMI NOT CALCULATED: HCPCS | Performed by: PHYSICIAN ASSISTANT

## 2020-07-09 PROCEDURE — G8427 DOCREV CUR MEDS BY ELIG CLIN: HCPCS | Performed by: PHYSICIAN ASSISTANT

## 2020-07-09 RX ORDER — PAROXETINE 10 MG/1
10 TABLET, FILM COATED ORAL DAILY
Qty: 30 TABLET | Refills: 0 | Status: SHIPPED | OUTPATIENT
Start: 2020-07-09 | End: 2020-07-30 | Stop reason: SDUPTHER

## 2020-07-09 RX ORDER — ALBUTEROL SULFATE 90 UG/1
2 AEROSOL, METERED RESPIRATORY (INHALATION) EVERY 4 HOURS PRN
Qty: 1 INHALER | Refills: 5 | Status: SHIPPED | OUTPATIENT
Start: 2020-07-09 | End: 2021-01-28 | Stop reason: SDUPTHER

## 2020-07-09 RX ORDER — MIRTAZAPINE 15 MG/1
15 TABLET, ORALLY DISINTEGRATING ORAL NIGHTLY
Qty: 30 TABLET | Refills: 3 | Status: SHIPPED | OUTPATIENT
Start: 2020-07-09 | End: 2020-08-13 | Stop reason: DRUGHIGH

## 2020-07-09 ASSESSMENT — PATIENT HEALTH QUESTIONNAIRE - PHQ9
2. FEELING DOWN, DEPRESSED OR HOPELESS: 3
3. TROUBLE FALLING OR STAYING ASLEEP: 2
5. POOR APPETITE OR OVEREATING: 2
7. TROUBLE CONCENTRATING ON THINGS, SUCH AS READING THE NEWSPAPER OR WATCHING TELEVISION: 1
SUM OF ALL RESPONSES TO PHQ QUESTIONS 1-9: 16
SUM OF ALL RESPONSES TO PHQ QUESTIONS 1-9: 16
10. IF YOU CHECKED OFF ANY PROBLEMS, HOW DIFFICULT HAVE THESE PROBLEMS MADE IT FOR YOU TO DO YOUR WORK, TAKE CARE OF THINGS AT HOME, OR GET ALONG WITH OTHER PEOPLE: 2
1. LITTLE INTEREST OR PLEASURE IN DOING THINGS: 3
9. THOUGHTS THAT YOU WOULD BE BETTER OFF DEAD, OR OF HURTING YOURSELF: 1
4. FEELING TIRED OR HAVING LITTLE ENERGY: 2
6. FEELING BAD ABOUT YOURSELF - OR THAT YOU ARE A FAILURE OR HAVE LET YOURSELF OR YOUR FAMILY DOWN: 2
8. MOVING OR SPEAKING SO SLOWLY THAT OTHER PEOPLE COULD HAVE NOTICED. OR THE OPPOSITE, BEING SO FIGETY OR RESTLESS THAT YOU HAVE BEEN MOVING AROUND A LOT MORE THAN USUAL: 0
SUM OF ALL RESPONSES TO PHQ9 QUESTIONS 1 & 2: 6

## 2020-07-09 ASSESSMENT — ENCOUNTER SYMPTOMS: COUGH: 1

## 2020-07-09 NOTE — PROGRESS NOTES
2020    TELEHEALTH EVALUATION -- Audio/Visual (During VGONE-50 public health emergency)    HPI:    Radha Eckert (:  1980) has requested an audio/video evaluation for the following concern(s):    Patient is seen initially through virtual visit however was converted to a telephone visit because of audio difficulty. He is establishing care today. He wants refills on his antidepressants. He states that in the past he has been on Remeron Celexa Xanax Percocet Bentyl Ambien Ativan. For his mood he had also been on Effexor states he did not like it it made him sick. In the past was on BuSpar Zoloft. States this was when he was young in high school. He has had problems with depression anxiety panic disorder since he was very young he states. He also struggles with social anxiety mild asthma COPD. He has also been on Suboxone due to drug abuse and he made it sound like he was abusing Percocet. He really did not elaborate much on his previous drug abuse. He denies drugs or alcohol currently. He states he has had a cough for 2 weeks productive in the morning yellow and white in color has some shortness of breath and wheezing. Energy has been down. No fever or chills. No one is sick around him. He is on disability due to his anxiety and Agoraphobia. Seen psychiatry in the past the last was Dr. Deneen Garcia at [facility]. Has had counseling the past that never helped. Does not like being around people. Does not like to go out. Gets sweaty when he goes to the store he really does not like going to the store and avoids it as much as possible. He talks to his significant other and his mother. Frequently he will just stay to himself. To cope will cry, go up by himself, shower or go for a drive. Mom has a history of depression anxiety not sure what she takes. Dad struggled with depression as well. He has not seen his father in over 5 years.   Sister has been treated for anxiety and depression has been on Zoloft and Ativan. Review of Systems   Constitutional: Positive for fatigue. Negative for appetite change, chills and fever. HENT: Negative for congestion, postnasal drip, rhinorrhea, sinus pressure, sinus pain, sneezing, sore throat and trouble swallowing. Eyes:        Wears glasses last eye exam 2 years ago. Respiratory: Positive for cough. Negative for chest tightness, shortness of breath and wheezing. Currently smokes a pack a day that is down from 3 packs a day. He states he gets chest symptoms shortness of breath if he has cold symptoms or ill. Again he currently has a cough. Cardiovascular: Negative for chest pain, palpitations and leg swelling. States maternal family has heart disease several uncles  before the age of 36. Gastrointestinal: Negative for diarrhea, nausea and vomiting. States he has IBS with diarrhea. Has had a history of peptic ulcer disease last upper EGD was at least 5 years ago. Genitourinary: Positive for frequency. Has urinary frequency not sure why. Musculoskeletal: Positive for back pain and neck pain. Negative for arthralgias and myalgias. States he has chronic neck and back pain from previous accidents. Currently not seeing pain management for this. Skin: Negative for rash. Neurological: Positive for headaches. Negative for weakness, light-headedness and numbness. Psychiatric/Behavioral: Negative for sleep disturbance. The patient is nervous/anxious. Depressed a lot due to anxiety. Has problems falling asleep mind races nervous gets worked up about things. Has a lot of anxiety at night. Feels overwhelmed at times. Prior to Visit Medications    Medication Sig Taking?  Authorizing Provider   mirtazapine (REMERON SOLTAB) 15 MG disintegrating tablet Take 1 tablet by mouth nightly Yes NATALIIA Sanchez   PARoxetine (PAXIL) 10 MG tablet Take 1 tablet by mouth daily Yes NATALIIA Sanchez albuterol sulfate HFA (VENTOLIN HFA) 108 (90 Base) MCG/ACT inhaler Inhale 2 puffs into the lungs every 4 hours as needed for Wheezing Yes NATALIIA Galicia   RA ALLERGY RELIEF 180 MG tablet take 1 tablet by mouth once daily Yes Ernestina Martin MD   SUBOXONE 8-2 MG FILM SL film  Yes Historical Provider, MD   ibuprofen (ADVIL;MOTRIN) 600 MG tablet Take 1 tablet by mouth every 6 hours as needed for Pain Yes Elijah Chadwick MD       Social History     Tobacco Use    Smoking status: Current Every Day Smoker     Packs/day: 1.50     Types: Cigarettes    Smokeless tobacco: Former User     Types: Chew    Tobacco comment: Looking at [address] [place], [place] [address] to try to get away from it  used to smoke 3 packs a day   Substance Use Topics    Alcohol use: No     Comment: socailly    Drug use: No     Comment: history of drug abuse         Allergies   Allergen Reactions    Ciprofloxacin    ,   Past Medical History:   Diagnosis Date    Anxiety     Has been hospitalized in past    Chronic back pain     Depression     Has been hospitalized in past    Irritable bowel syndrome    ,   Past Surgical History:   Procedure Laterality Date    APPENDECTOMY      HERNIA REPAIR      1 left; 2 on right    SINUS SURGERY      TONSILLECTOMY      TYMPANOSTOMY TUBE PLACEMENT      3 times prior   ,   Social History     Tobacco Use    Smoking status: Current Every Day Smoker     Packs/day: 1.50     Types: Cigarettes    Smokeless tobacco: Former User     Types: Chew    Tobacco comment: Looking at [address] [place], [place] [address] to try to get away from it  used to smoke 3 packs a day   Substance Use Topics    Alcohol use: No     Comment: socailly    Drug use: No     Comment: history of drug abuse    ,   Family History   Problem Relation Age of Onset    Other Mother         Anxiety/Depression    Mental Illness Mother         anxiety and depression    High Blood Pressure Mother     Heart Attack Father         MI age 30    Heart Disease Father     Other Father PUD  Depression    Heart Attack Maternal Uncle     Heart Attack Paternal Uncle     Other Maternal Uncle         Suicide    Other Paternal Uncle         Suicide    Mental Illness Sister         anxiety and depression   ,   Immunization History   Administered Date(s) Administered    Hepatitis A Adult (Havrix, Vaqta) 04/06/2011    Hepatitis B 04/06/2011, 05/11/2011    Influenza A (H5N1) Monovalent Vaccine, Adjuvanted-2013 10/27/2009    Influenza Virus Vaccine 11/24/2006, 10/23/2008, 04/06/2011, 09/03/2018    Influenza, Quadv, IM, PF (6 mo and older Fluzone, Flulaval, Fluarix, and 3 yrs and older Afluria) 10/31/2017, 11/13/2019    Meningococcal MPSV4 (Menomune) 04/06/2011    Plague 04/06/2011    Tdap (Boostrix, Adacel) 04/06/2011   ,   Health Maintenance   Topic Date Due    Potassium monitoring  1980    Creatinine monitoring  1980    Varicella vaccine (1 of 2 - 2-dose childhood series) 09/27/1981    Pneumococcal 0-64 years Vaccine (1 of 1 - PPSV23) 09/27/1986    HIV screen  09/27/1995    Flu vaccine (1) 09/01/2020    DTaP/Tdap/Td vaccine (2 - Td) 04/06/2021    Hepatitis A vaccine  Aged Out    Hepatitis B vaccine  Aged Out    Hib vaccine  Aged Out    Meningococcal (ACWY) vaccine  Aged Out       PHYSICAL EXAMINATION:  [ INSTRUCTIONS:  \"[x]\" Indicates a positive item  \"[]\" Indicates a negative item  -- DELETE ALL ITEMS NOT EXAMINED]  Vital Signs: (As obtained by patient/caregiver or practitioner observation)    Blood pressure-  Heart rate-    Respiratory rate- WNL   Temperature-  Pulse oximetry-     Constitutional: [x] Appears well-developed and well-nourished [x] No apparent distress      [] Abnormal-   Mental status  [x] Alert and awake  [x] Oriented to person/place/time [x]Able to follow commands      Eyes:  EOM    [x]  Normal  [] Abnormal-  Sclera  [x]  Normal  [] Abnormal -         Discharge [x]  None visible  [] Abnormal -    HENT:   [x] Normocephalic, atraumatic.   [] Abnormal   [] Mouth/Throat: Mucous membranes are moist.     External Ears [x] Normal  [] Abnormal-     Neck: [x] No visualized mass     Pulmonary/Chest: [x] Respiratory effort normal.  [x] No visualized signs of difficulty breathing or respiratory distress        [] Abnormal-      Musculoskeletal:   [x] Normal gait with no signs of ataxia         [x] Normal range of motion of neck        [] Abnormal-       Neurological:        [x] No Facial Asymmetry (Cranial nerve 7 motor function) (limited exam to video visit)          [x] No gaze palsy        [] Abnormal-         Skin:        [x] No significant exanthematous lesions or discoloration noted on facial skin         [] Abnormal-            Psychiatric:       [x] Normal Affect [x] No Hallucinations     Speech is normal responds appropriately when question. Good eye contact. He is not tearful or overly anxious. No evidence for suicide homicidal ideation. [] Abnormal-     Other pertinent observable physical exam findings-     ASSESSMENT/PLAN:  1. Positive depression screening    - Positive Screen for Clinical Depression with a Documented Follow-up Plan   - Comprehensive Metabolic Panel; Future    2. Anxiety with depression    - mirtazapine (REMERON SOLTAB) 15 MG disintegrating tablet; Take 1 tablet by mouth nightly  Dispense: 30 tablet; Refill: 3  - PARoxetine (PAXIL) 10 MG tablet; Take 1 tablet by mouth daily  Dispense: 30 tablet; Refill: 0  - Comprehensive Metabolic Panel; Future  - Vitamin D 25 Hydroxy; Future  - CBC Auto Differential; Future  - TSH With Reflex Ft4; Future    3. Smoker  - XR CHEST STANDARD (2 VW); Future  - albuterol sulfate HFA (VENTOLIN HFA) 108 (90 Base) MCG/ACT inhaler; Inhale 2 puffs into the lungs every 4 hours as needed for Wheezing  Dispense: 1 Inhaler; Refill: 5    4. Cough    - XR CHEST STANDARD (2 VW); Future  - albuterol sulfate HFA (VENTOLIN HFA) 108 (90 Base) MCG/ACT inhaler;  Inhale 2 puffs into the lungs every 4 hours as needed for Wheezing  Dispense: 1 Inhaler; Refill: 5    5. H/O: substance abuse ([address]    - Comprehensive Metabolic Panel; Future  - Vitamin D 25 Hydroxy; Future  - CBC Auto Differential; Future    6. Other insomnia    - CBC Auto Differential; Future    7. Screening for condition    - Lipid, Fasting; Future    8. Urinary frequency    - Urinalysis Reflex to Culture; Future    9. Encounter to establish care      He will be notified of all results  Further treatment based on results  He is to have in office appointment 1 month sooner if problems  Advised he is not going to get ambien,  narcotics or Ativan from me. Especially since I am just meeting him. Needs to follow-up with psychiatry given his history especially  We will start him on Paxil and Remeron and see how he does  Congratulated him on decreasing his tobacco use. Cautioned him about vaping  Coping mechanisms  Good nutrition hydration  reCommended flu shot in the fall  Refill his Ventolin  Answered his questions  [place] reviewed today    Return in about 4 weeks (around 8/6/2020). Gerardo Ybarra is a 44 y.o. male being evaluated by a Virtual Visit (video visit) encounter to address concerns as mentioned above. A caregiver was present when appropriate. Due to this being a TeleHealth encounter (During [place] [id number] public health emergency), evaluation of the following organ systems was limited: Vitals/Constitutional/EENT/Resp/CV/GI//MS/Neuro/Skin/Heme-Lymph-Imm. Pursuant to the emergency declaration under the [facility], [address] [place] [address] authority and the [employer] Act, this Virtual Visit was conducted with patient's (and/or legal guardian's) consent, to reduce the patient's risk of exposure to COVID-19 and provide necessary medical care.   The patient (and/or legal guardian) has also been advised to contact this office for worsening conditions or problems, and seek emergency medical treatment and/or call 911 if deemed necessary. Patient identification was verified at the start of the visit: Yes    Total time spent on this encounter: 45 minutes    Services were provided through a video synchronous discussion virtually to substitute for in-person clinic visit. Patient and provider were located at their individual homes. --NATALIIA Doran on 7/12/2020 at 3:05 PM    An electronic signature was used to authenticate this note. On the basis of positive PHQ-9 screening (PHQ-9 Total Score: 16), the following plan was implemented: medication prescribed: Paxil- 10 mg daily and remeron 15 mg qhs- patient will call for any significant medication side effects or worsening symptoms of depression and anxiety. Patient will follow-up in 4 week(s) with PCP.

## 2020-07-12 ASSESSMENT — ENCOUNTER SYMPTOMS
SORE THROAT: 0
VOMITING: 0
DIARRHEA: 0
SHORTNESS OF BREATH: 0
WHEEZING: 0
SINUS PAIN: 0
TROUBLE SWALLOWING: 0
BACK PAIN: 1
RHINORRHEA: 0
SINUS PRESSURE: 0
CHEST TIGHTNESS: 0
NAUSEA: 0

## 2020-07-13 ENCOUNTER — TELEPHONE (OUTPATIENT)
Dept: FAMILY MEDICINE CLINIC | Age: 40
End: 2020-07-13

## 2020-07-30 NOTE — TELEPHONE ENCOUNTER
Diony Reyes called requesting a refill of the below medication which has been pended for you:     Requested Prescriptions      No prescriptions requested or ordered in this encounter       Last Appointment Date: 7/9/2020  Next Appointment Date: 8/12/2020    Allergies   Allergen Reactions    Ciprofloxacin

## 2020-07-31 RX ORDER — PAROXETINE 10 MG/1
10 TABLET, FILM COATED ORAL DAILY
Qty: 30 TABLET | Refills: 5 | Status: SHIPPED | OUTPATIENT
Start: 2020-07-31 | End: 2020-08-13 | Stop reason: DRUGHIGH

## 2020-08-13 ENCOUNTER — OFFICE VISIT (OUTPATIENT)
Dept: FAMILY MEDICINE CLINIC | Age: 40
End: 2020-08-13
Payer: MEDICARE

## 2020-08-13 VITALS
TEMPERATURE: 94 F | BODY MASS INDEX: 23.19 KG/M2 | DIASTOLIC BLOOD PRESSURE: 60 MMHG | SYSTOLIC BLOOD PRESSURE: 122 MMHG | HEIGHT: 70 IN | OXYGEN SATURATION: 98 % | HEART RATE: 102 BPM | WEIGHT: 162 LBS

## 2020-08-13 PROCEDURE — G8427 DOCREV CUR MEDS BY ELIG CLIN: HCPCS | Performed by: PHYSICIAN ASSISTANT

## 2020-08-13 PROCEDURE — G8420 CALC BMI NORM PARAMETERS: HCPCS | Performed by: PHYSICIAN ASSISTANT

## 2020-08-13 PROCEDURE — 99211 OFF/OP EST MAY X REQ PHY/QHP: CPT

## 2020-08-13 PROCEDURE — 99214 OFFICE O/P EST MOD 30 MIN: CPT | Performed by: PHYSICIAN ASSISTANT

## 2020-08-13 PROCEDURE — 4004F PT TOBACCO SCREEN RCVD TLK: CPT | Performed by: PHYSICIAN ASSISTANT

## 2020-08-13 RX ORDER — METOPROLOL SUCCINATE 25 MG/1
25 TABLET, EXTENDED RELEASE ORAL DAILY
Qty: 30 TABLET | Refills: 3 | Status: SHIPPED | OUTPATIENT
Start: 2020-08-13 | End: 2021-10-18 | Stop reason: SDUPTHER

## 2020-08-13 RX ORDER — AZITHROMYCIN 250 MG/1
250 TABLET, FILM COATED ORAL SEE ADMIN INSTRUCTIONS
Qty: 6 TABLET | Refills: 0 | Status: SHIPPED | OUTPATIENT
Start: 2020-08-13 | End: 2020-08-18

## 2020-08-13 RX ORDER — MIRTAZAPINE 30 MG/1
30 TABLET, FILM COATED ORAL NIGHTLY
Qty: 30 TABLET | Refills: 2 | Status: SHIPPED | OUTPATIENT
Start: 2020-08-13 | End: 2021-10-18

## 2020-08-13 RX ORDER — PAROXETINE HYDROCHLORIDE 20 MG/1
20 TABLET, FILM COATED ORAL DAILY
Qty: 30 TABLET | Refills: 1 | Status: SHIPPED | OUTPATIENT
Start: 2020-08-13 | End: 2020-10-07 | Stop reason: SDUPTHER

## 2020-08-13 RX ORDER — MONTELUKAST SODIUM 10 MG/1
10 TABLET ORAL NIGHTLY
Qty: 30 TABLET | Refills: 3 | Status: SHIPPED | OUTPATIENT
Start: 2020-08-13 | End: 2020-11-30 | Stop reason: SDUPTHER

## 2020-08-13 ASSESSMENT — ENCOUNTER SYMPTOMS
WHEEZING: 1
NAUSEA: 0
DIARRHEA: 0
COUGH: 1
VOMITING: 0
SHORTNESS OF BREATH: 1

## 2020-08-13 NOTE — PROGRESS NOTES
[facility]    Subjective:      Patient ID: Steph Lopez is a 44 y.o. y.o. male. Patient is seen following up on his nerves. His anxiety is real bad he states. Depression and anxiety. Still having panic attacks. Currently unemployed. Has been on lexapro, zoloft and effexor. The medications helped with sleep and depression the first week or 2 then now it is not as effective. Panic attacks every day on and off during the day.           Past Medical History:   Diagnosis Date    Anxiety     Has been hospitalized in past    Chronic back pain     Depression     Has been hospitalized in past    Irritable bowel syndrome        Past Surgical History:   Procedure Laterality Date    APPENDECTOMY      HERNIA REPAIR      1 left; 2 on right    SINUS SURGERY      TONSILLECTOMY      TYMPANOSTOMY TUBE PLACEMENT      3 times prior       Family History   Problem Relation Age of Onset    Other Mother         Anxiety/Depression    Mental Illness Mother         anxiety and depression    High Blood Pressure Mother     Heart Attack Father         MI age 29    Heart Disease Father     Other Father         PUD  Depression    Heart Attack Maternal Uncle     Heart Attack Paternal Uncle     Other Maternal Uncle         Suicide    Other Paternal Uncle         Suicide    Mental Illness Sister         anxiety and depression       Allergies   Allergen Reactions    Ciprofloxacin        Current Outpatient Medications   Medication Sig Dispense Refill    PARoxetine (PAXIL) 20 MG tablet Take 1 tablet by mouth daily 30 tablet 1    mirtazapine (REMERON SOLTAB) 30 MG disintegrating tablet Take 1 tablet by mouth nightly 30 tablet 2    montelukast (SINGULAIR) 10 MG tablet Take 1 tablet by mouth nightly 30 tablet 3    azithromycin (ZITHROMAX) 250 MG tablet Take 1 tablet by mouth See Admin Instructions for 5 days 500mg on day 1 followed by 250mg on days 2 - 5 6 tablet 0    metoprolol succinate (TOPROL XL) 25 MG extended release tablet Take 1 tablet by mouth daily 30 tablet 3    albuterol sulfate HFA (VENTOLIN HFA) 108 (90 Base) MCG/ACT inhaler Inhale 2 puffs into the lungs every 4 hours as needed for Wheezing 1 Inhaler 5    RA ALLERGY RELIEF 180 MG tablet take 1 tablet by mouth once daily 30 tablet 5    SUBOXONE 8-2 MG FILM SL film       ibuprofen (ADVIL;MOTRIN) 600 MG tablet Take 1 tablet by mouth every 6 hours as needed for Pain 30 tablet 0     No current facility-administered medications for this visit. Review of Systems   Constitutional: Negative for appetite change, chills, fatigue and fever. HENT: Positive for congestion. Has some allergies. Uses OTC medication and flonase. Respiratory: Positive for cough, shortness of breath and wheezing. Has been coughing for a few months went to ER a few weeks ago. Was on augmentin it did not help. It is productive and clear to yellow. Smokes 1.5 packs a day. It was up to 3 pk a day. Cardiovascular: Positive for palpitations. Negative for chest pain. Has rapid HR. Worries about everything. Gastrointestinal: Negative for diarrhea, nausea and vomiting. Musculoskeletal: Negative for myalgias. Skin: Negative for rash. Neurological: Positive for seizures and headaches. Negative for dizziness, light-headedness and numbness. States has history of seizure 5 years ago. Psychiatric/Behavioral: Positive for agitation. Negative for self-injury, sleep disturbance and suicidal ideas. The patient is nervous/anxious. At times fleeting thoughts sad and wanted to quit. It freaks him out. Wants to make it stop. Has not had in quite some time. No plan. Mind races. Tearful at times. Getting things done. Keeps himself busy this helps.           Objective:      /60   Pulse 102   Temp 94 °F (34.4 °C)   Ht 5' 10\" (1.778 m)   Wt 162 lb (73.5 kg)   SpO2 98%   BMI 23.24 kg/m²     Physical Exam  Vitals signs and nursing note reviewed. Constitutional:       General: He is not in acute distress. Appearance: Normal appearance. He is well-developed. He is not ill-appearing. Comments: Looks older than his stated age. HENT:      Head: Normocephalic and atraumatic. Right Ear: Tympanic membrane, ear canal and external ear normal.      Left Ear: Tympanic membrane, ear canal and external ear normal.      Nose: Nose normal. No congestion or rhinorrhea. Mouth/Throat:      Mouth: Mucous membranes are moist.      Pharynx: Posterior oropharyngeal erythema present. No oropharyngeal exudate. Comments:  pharynx is slightly erythematous postnasal drip noted. Eyes:      General: No scleral icterus. Conjunctiva/sclera: Conjunctivae normal.   Neck:      Musculoskeletal: Normal range of motion and neck supple. No neck rigidity or muscular tenderness. Thyroid: No thyroid mass, thyromegaly or thyroid tenderness. Cardiovascular:      Rate and Rhythm: Regular rhythm. Tachycardia present. Heart sounds: Normal heart sounds. No murmur. No gallop. Pulmonary:      Effort: Pulmonary effort is normal. No respiratory distress. Breath sounds: Wheezing and rhonchi present. No rales. Comments: Slight wheezing noted throughout the lung fields anteriorly and posteriorly. Rhonchi noted that did not clear with cough. No rales noted. Abdominal:      General: Bowel sounds are normal. There is no distension. Palpations: Abdomen is soft. There is no mass. Tenderness: There is no abdominal tenderness. There is no guarding or rebound. Hernia: No hernia is present. Musculoskeletal:         General: No swelling, tenderness, deformity or signs of injury. Right lower leg: No edema. Left lower leg: No edema. Lymphadenopathy:      Cervical: No cervical adenopathy. Skin:     General: Skin is warm and dry. Findings: No erythema or rash.    Neurological: General: No focal deficit present. Mental Status: He is alert and oriented to person, place, and time. Sensory: No sensory deficit. Coordination: Coordination normal.      Gait: Gait normal.      Comments: No tremors noted. Psychiatric:         Mood and Affect: Mood normal.         Behavior: Behavior normal.      Comments: Speech is normal responds appropriately when questioned. He sits very comfortably on the exam table. He is coherent good eye contact. Not overly anxious or fidgety. No evidence for suicide homicidal ideation. Assessment & Plan:     1. Anxiety with depression    - PARoxetine (PAXIL) 20 MG tablet; Take 1 tablet by mouth daily  Dispense: 30 tablet; Refill: 1  - mirtazapine (REMERON SOLTAB) 30 MG disintegrating tablet; Take 1 tablet by mouth nightly  Dispense: 30 tablet; Refill: 2    2. Seasonal allergic rhinitis due to pollen    - montelukast (SINGULAIR) 10 MG tablet; Take 1 tablet by mouth nightly  Dispense: 30 tablet; Refill: 3    3. Tachycardia    - EKG 12 Lead; Future  - Ambulatory referral to Cardiology  - metoprolol succinate (TOPROL XL) 25 MG extended release tablet; Take 1 tablet by mouth daily  Dispense: 30 tablet; Refill: 3    4. Acute bronchitis, unspecified organism    - azithromycin (ZITHROMAX) 250 MG tablet; Take 1 tablet by mouth See Admin Instructions for 5 days 500mg on day 1 followed by 250mg on days 2 - 5  Dispense: 6 tablet; Refill: 0      Due for fasting labs they are ordered asked patient to complete them as soon as possible  Sees someone in [facility].   Goes once a month  Coping mechanisms  He is trying to decrease his tobacco use  Fluids rest  Good nutrition  We will increase Paxil to 20 mg daily increase Remeron to 30 mg at bedtime  Based on history of tachycardia and his history in general refer to cardiology for further evaluation  Answered his questions  Reminded patient I will not be giving him benzodiazepines based on his history  Follow-up in 2 weeks sooner if problems  He is to complete chest x-ray it has been ordered for over a month    Ben Zhouma  8/18/2020 4:54 PM EDT    (Pleasenote that portions of this note were completed with a voice recognition program.Efforts were made to edit the dictations but occasionally words are mis-transcribed.)

## 2020-09-01 ENCOUNTER — HOSPITAL ENCOUNTER (OUTPATIENT)
Dept: GENERAL RADIOLOGY | Age: 40
Discharge: HOME | End: 2020-09-03
Payer: MEDICARE

## 2020-09-01 ENCOUNTER — HOSPITAL ENCOUNTER (OUTPATIENT)
Dept: NON INVASIVE DIAGNOSTICS | Age: 40
Discharge: HOME | End: 2020-09-01
Payer: MEDICARE

## 2020-09-01 ENCOUNTER — HOSPITAL ENCOUNTER (OUTPATIENT)
Dept: LAB | Age: 40
Discharge: HOME | End: 2020-09-01
Payer: MEDICARE

## 2020-09-01 LAB
-: ABNORMAL
ABSOLUTE EOS #: 0.43 K/UL (ref 0–0.44)
ABSOLUTE IMMATURE GRANULOCYTE: 0.06 K/UL (ref 0–0.3)
ABSOLUTE LYMPH #: 4.6 K/UL (ref 1.1–3.7)
ABSOLUTE MONO #: 0.83 K/UL (ref 0.1–1.2)
ALBUMIN SERPL-MCNC: 4.4 G/DL (ref 3.5–5.2)
ALBUMIN/GLOBULIN RATIO: 1.2 (ref 1–2.5)
ALP BLD-CCNC: 90 U/L (ref 40–129)
ALT SERPL-CCNC: 13 U/L (ref 5–41)
AMORPHOUS: ABNORMAL
ANION GAP SERPL CALCULATED.3IONS-SCNC: 11 MMOL/L (ref 9–17)
AST SERPL-CCNC: 16 U/L
BACTERIA: ABNORMAL
BASOPHILS # BLD: 1 % (ref 0–2)
BASOPHILS ABSOLUTE: 0.12 K/UL (ref 0–0.2)
BILIRUB SERPL-MCNC: 0.2 MG/DL (ref 0.3–1.2)
BILIRUBIN URINE: NEGATIVE
BUN BLDV-MCNC: 6 MG/DL (ref 6–20)
BUN/CREAT BLD: 6 (ref 9–20)
CALCIUM SERPL-MCNC: 9.4 MG/DL (ref 8.6–10.4)
CASTS UA: ABNORMAL /LPF (ref 0–2)
CHLORIDE BLD-SCNC: 96 MMOL/L (ref 98–107)
CHOLESTEROL, FASTING: 246 MG/DL
CHOLESTEROL/HDL RATIO: 7.2
CO2: 27 MMOL/L (ref 20–31)
COLOR: ABNORMAL
COMMENT UA: ABNORMAL
CREAT SERPL-MCNC: 0.93 MG/DL (ref 0.7–1.2)
CRYSTALS, UA: ABNORMAL /HPF
DIFFERENTIAL TYPE: ABNORMAL
EOSINOPHILS RELATIVE PERCENT: 4 % (ref 1–4)
EPITHELIAL CELLS UA: ABNORMAL /HPF (ref 0–5)
GFR AFRICAN AMERICAN: >60 ML/MIN
GFR NON-AFRICAN AMERICAN: >60 ML/MIN
GFR SERPL CREATININE-BSD FRML MDRD: ABNORMAL ML/MIN/{1.73_M2}
GFR SERPL CREATININE-BSD FRML MDRD: ABNORMAL ML/MIN/{1.73_M2}
GLUCOSE BLD-MCNC: 112 MG/DL (ref 70–99)
GLUCOSE URINE: NEGATIVE
HCT VFR BLD CALC: 49.4 % (ref 40.7–50.3)
HDLC SERPL-MCNC: 34 MG/DL
HEMOGLOBIN: 16.2 G/DL (ref 13–17)
IMMATURE GRANULOCYTES: 1 %
KETONES, URINE: NEGATIVE
LDL CHOLESTEROL: 168 MG/DL (ref 0–130)
LEUKOCYTE ESTERASE, URINE: NEGATIVE
LYMPHOCYTES # BLD: 41 % (ref 24–43)
MCH RBC QN AUTO: 30.5 PG (ref 25.2–33.5)
MCHC RBC AUTO-ENTMCNC: 32.8 G/DL (ref 25.2–33.5)
MCV RBC AUTO: 93 FL (ref 82.6–102.9)
MONOCYTES # BLD: 8 % (ref 3–12)
MUCUS: ABNORMAL
NITRITE, URINE: NEGATIVE
NRBC AUTOMATED: 0 PER 100 WBC
OTHER OBSERVATIONS UA: ABNORMAL
PDW BLD-RTO: 13.6 % (ref 11.8–14.4)
PH UA: 6 (ref 5–6)
PLATELET # BLD: 350 K/UL (ref 138–453)
PLATELET ESTIMATE: ABNORMAL
PMV BLD AUTO: 9.2 FL (ref 8.1–13.5)
POTASSIUM SERPL-SCNC: 4.1 MMOL/L (ref 3.7–5.3)
PROTEIN UA: NEGATIVE
RBC # BLD: 5.31 M/UL (ref 4.21–5.77)
RBC # BLD: ABNORMAL 10*6/UL
RBC UA: ABNORMAL /HPF (ref 0–4)
RENAL EPITHELIAL, UA: ABNORMAL /HPF
SEG NEUTROPHILS: 45 % (ref 36–65)
SEGMENTED NEUTROPHILS ABSOLUTE COUNT: 5.06 K/UL (ref 1.5–8.1)
SODIUM BLD-SCNC: 134 MMOL/L (ref 135–144)
SPECIFIC GRAVITY UA: 1.01 (ref 1.01–1.02)
TOTAL PROTEIN: 8 G/DL (ref 6.4–8.3)
TRICHOMONAS: ABNORMAL
TRIGLYCERIDE, FASTING: 219 MG/DL
TSH SERPL DL<=0.05 MIU/L-ACNC: 0.93 MIU/L (ref 0.3–5)
TURBIDITY: ABNORMAL
URINE HGB: ABNORMAL
UROBILINOGEN, URINE: NORMAL
VITAMIN D 25-HYDROXY: 27 NG/ML (ref 30–100)
VLDLC SERPL CALC-MCNC: ABNORMAL MG/DL (ref 1–30)
WBC # BLD: 11.1 K/UL (ref 3.5–11.3)
WBC # BLD: ABNORMAL 10*3/UL
WBC UA: ABNORMAL /HPF (ref 0–4)
YEAST: ABNORMAL

## 2020-09-01 PROCEDURE — 85025 COMPLETE CBC W/AUTO DIFF WBC: CPT

## 2020-09-01 PROCEDURE — 80053 COMPREHEN METABOLIC PANEL: CPT

## 2020-09-01 PROCEDURE — 71046 X-RAY EXAM CHEST 2 VIEWS: CPT

## 2020-09-01 PROCEDURE — 93005 ELECTROCARDIOGRAM TRACING: CPT

## 2020-09-01 PROCEDURE — 36415 COLL VENOUS BLD VENIPUNCTURE: CPT

## 2020-09-01 PROCEDURE — 80061 LIPID PANEL: CPT

## 2020-09-01 PROCEDURE — 82306 VITAMIN D 25 HYDROXY: CPT

## 2020-09-01 PROCEDURE — 84443 ASSAY THYROID STIM HORMONE: CPT

## 2020-09-01 PROCEDURE — 81001 URINALYSIS AUTO W/SCOPE: CPT

## 2020-09-02 ENCOUNTER — OFFICE VISIT (OUTPATIENT)
Dept: FAMILY MEDICINE CLINIC | Age: 40
End: 2020-09-02
Payer: MEDICARE

## 2020-09-02 VITALS
HEIGHT: 71 IN | DIASTOLIC BLOOD PRESSURE: 62 MMHG | HEART RATE: 84 BPM | SYSTOLIC BLOOD PRESSURE: 112 MMHG | TEMPERATURE: 97.7 F | BODY MASS INDEX: 23.8 KG/M2 | WEIGHT: 170 LBS

## 2020-09-02 LAB
EKG ATRIAL RATE: 73 BPM
EKG P-R INTERVAL: 152 MS
EKG Q-T INTERVAL: 370 MS
EKG QRS DURATION: 90 MS
EKG QTC CALCULATION (BAZETT): 407 MS
EKG R AXIS: 146 DEGREES
EKG T AXIS: 163 DEGREES
EKG VENTRICULAR RATE: 73 BPM

## 2020-09-02 PROCEDURE — 99212 OFFICE O/P EST SF 10 MIN: CPT

## 2020-09-02 PROCEDURE — 99214 OFFICE O/P EST MOD 30 MIN: CPT | Performed by: PHYSICIAN ASSISTANT

## 2020-09-02 PROCEDURE — G8926 SPIRO NO PERF OR DOC: HCPCS | Performed by: PHYSICIAN ASSISTANT

## 2020-09-02 PROCEDURE — 4004F PT TOBACCO SCREEN RCVD TLK: CPT | Performed by: PHYSICIAN ASSISTANT

## 2020-09-02 PROCEDURE — G8420 CALC BMI NORM PARAMETERS: HCPCS | Performed by: PHYSICIAN ASSISTANT

## 2020-09-02 PROCEDURE — 3023F SPIROM DOC REV: CPT | Performed by: PHYSICIAN ASSISTANT

## 2020-09-02 PROCEDURE — G8427 DOCREV CUR MEDS BY ELIG CLIN: HCPCS | Performed by: PHYSICIAN ASSISTANT

## 2020-09-02 RX ORDER — BUDESONIDE AND FORMOTEROL FUMARATE DIHYDRATE 160; 4.5 UG/1; UG/1
2 AEROSOL RESPIRATORY (INHALATION) 2 TIMES DAILY
Qty: 1 INHALER | Refills: 5 | Status: SHIPPED | OUTPATIENT
Start: 2020-09-02 | End: 2021-10-18 | Stop reason: SDUPTHER

## 2020-09-02 RX ORDER — PAROXETINE HYDROCHLORIDE 20 MG/1
20 TABLET, FILM COATED ORAL 2 TIMES DAILY
Qty: 60 TABLET | Refills: 3 | Status: SHIPPED | OUTPATIENT
Start: 2020-09-02 | End: 2020-10-07 | Stop reason: ALTCHOICE

## 2020-09-02 RX ORDER — AZITHROMYCIN 250 MG/1
250 TABLET, FILM COATED ORAL SEE ADMIN INSTRUCTIONS
Qty: 6 TABLET | Refills: 0 | Status: SHIPPED | OUTPATIENT
Start: 2020-09-02 | End: 2020-09-07

## 2020-09-02 ASSESSMENT — ENCOUNTER SYMPTOMS
NAUSEA: 0
CHEST TIGHTNESS: 0
DIARRHEA: 0
WHEEZING: 1
SHORTNESS OF BREATH: 0
VOMITING: 0
COUGH: 1

## 2020-09-02 NOTE — PROGRESS NOTES
[facility]    Subjective:      Patient ID: Baron Schaeffer is a 44 y.o. y.o. male. Patient is seen for his mood. He has a ganglion cyst right wrist it bothers him lately. He started his toprol and no problems. They notice his pulse is not as high as it has been. I treated him for bronchitis and his sx are back but he still smokes 2 packs a day. Cough is productive in am and yellow. Complaining nausea past few days taking OTC medications. Noted heartburn. I gave him remeron 30 mg daily and paxil 20 mg daily. Initially the remeron it helped him sleep now it is not helping. Can not fall asleep but once asleep is ok stays asleep. This varies though. Anxiety is not well controlled. Is working with recovery services sees a counselor and waiting to see psychiatry.          Past Medical History:   Diagnosis Date    Anxiety     Has been hospitalized in past    Chronic back pain     Depression     Has been hospitalized in past    Irritable bowel syndrome        Past Surgical History:   Procedure Laterality Date    APPENDECTOMY      HERNIA REPAIR      1 left; 2 on right    SINUS SURGERY      TONSILLECTOMY      TYMPANOSTOMY TUBE PLACEMENT      3 times prior       Family History   Problem Relation Age of Onset    Other Mother         Anxiety/Depression    Mental Illness Mother         anxiety and depression    High Blood Pressure Mother     Heart Attack Father         MI age 29    Heart Disease Father     Other Father         PUD  Depression    Heart Attack Maternal Uncle     Heart Attack Paternal Uncle     Other Maternal Uncle         Suicide    Other Paternal Uncle         Suicide    Mental Illness Sister         anxiety and depression       Allergies   Allergen Reactions    Ciprofloxacin        Current Outpatient Medications   Medication Sig Dispense Refill    PARoxetine (PAXIL) 20 MG tablet Take 1 tablet by mouth 2 times daily 60 tablet 3    budesonide-formoterol (SYMBICORT) 160-4.5 MCG/ACT AERO Inhale 2 puffs into the lungs 2 times daily 1 Inhaler 5    PARoxetine (PAXIL) 20 MG tablet Take 1 tablet by mouth daily 30 tablet 1    mirtazapine (REMERON SOLTAB) 30 MG disintegrating tablet Take 1 tablet by mouth nightly 30 tablet 2    montelukast (SINGULAIR) 10 MG tablet Take 1 tablet by mouth nightly 30 tablet 3    metoprolol succinate (TOPROL XL) 25 MG extended release tablet Take 1 tablet by mouth daily 30 tablet 3    albuterol sulfate HFA (VENTOLIN HFA) 108 (90 Base) MCG/ACT inhaler Inhale 2 puffs into the lungs every 4 hours as needed for Wheezing 1 Inhaler 5    SUBOXONE 8-2 MG FILM SL film       ibuprofen (ADVIL;MOTRIN) 600 MG tablet Take 1 tablet by mouth every 6 hours as needed for Pain 30 tablet 0    RA ALLERGY RELIEF 180 MG tablet take 1 tablet by mouth once daily (Patient not taking: Reported on 9/2/2020) 30 tablet 5     No current facility-administered medications for this visit. Review of Systems   Constitutional: Negative for appetite change, chills, fatigue and fever. HENT: Negative. Respiratory: Positive for cough and wheezing. Negative for chest tightness and shortness of breath. Wheezing in am.    Cardiovascular: Negative for chest pain and palpitations. Gastrointestinal: Negative for diarrhea, nausea and vomiting. Genitourinary: Positive for frequency. Negative for difficulty urinating and dysuria. Drinks half [facility]. Musculoskeletal: Negative for myalgias. Skin: Negative for rash. Neurological: Positive for light-headedness. Negative for dizziness, numbness and headaches. Lightheaded occasionally. Psychiatric/Behavioral: Positive for agitation. Negative for self-injury, sleep disturbance and suicidal ideas. The patient is nervous/anxious. No thoughts of hurting himself since last appt. Panic attacks still daily he states. SO has not seen this though.   He states will go to bathroom and calm down. Objective:      /62 (Site: Left Upper Arm, Position: Sitting, Cuff Size: Medium Adult)   Pulse 84   Temp 97.7 °F (36.5 °C)   Ht 5' 11\" (1.803 m)   Wt 170 lb (77.1 kg)   BMI 23.71 kg/m²     Physical Exam  Vitals signs and nursing note reviewed. Constitutional:       General: He is not in acute distress. Appearance: Normal appearance. He is well-developed. He is not ill-appearing. Comments: Looks older than his stated age. HENT:      Head: Normocephalic and atraumatic. Right Ear: Tympanic membrane, ear canal and external ear normal.      Left Ear: Tympanic membrane, ear canal and external ear normal.      Nose: Congestion present. No rhinorrhea. Mouth/Throat:      Mouth: Mucous membranes are moist.      Pharynx: Posterior oropharyngeal erythema present. No oropharyngeal exudate. Eyes:      General: No scleral icterus. Conjunctiva/sclera: Conjunctivae normal.   Neck:      Musculoskeletal: Normal range of motion and neck supple. No neck rigidity or muscular tenderness. Cardiovascular:      Rate and Rhythm: Normal rate and regular rhythm. Heart sounds: Normal heart sounds. No murmur. No gallop. Pulmonary:      Effort: Pulmonary effort is normal. No respiratory distress. Breath sounds: No stridor. Wheezing and rhonchi present. No rales. Comments: Lung sounds are still coarse slight rhonchi noted improved though from previous visit. No significant wheezing noted no rales. Just a slight wheeze noted at the left apex only. Very minimal.  Abdominal:      General: Bowel sounds are normal. There is no distension. Palpations: Abdomen is soft. There is no mass. Tenderness: There is no abdominal tenderness. There is no guarding or rebound. Hernia: No hernia is present. Musculoskeletal: Normal range of motion. General: No swelling, tenderness, deformity or signs of injury. Right lower leg: No edema.       Left lower leg: No edema. Comments: He has a ganglion cyst right wrist laterally ulnar aspect dorsum aspect of wrist.   Lymphadenopathy:      Cervical: No cervical adenopathy. Skin:     General: Skin is warm and dry. Findings: No erythema or rash. Neurological:      General: No focal deficit present. Mental Status: He is alert and oriented to person, place, and time. Sensory: No sensory deficit. Gait: Gait normal.   Psychiatric:         Mood and Affect: Mood normal.         Behavior: Behavior normal.         Thought Content: Thought content normal.         Judgment: Judgment normal.      Comments: He tells me initially he is not much better however he is sitting in my office he is not tremulous or fidgety. His knee is not shaking up and down. He is calm and speech is normal responds appropriately when questioned today. No evidence for suicide homicidal ideation. He is coherent good eye contact looks alert. Assessment & Plan:      Diagnosis Orders   1. Need for vaccination     2. Essential hypertension     3. Ganglion cyst of dorsum of right wrist  Rogelio Sorensen MD, Orthopedic Surgery, [place]   4. Anxiety with depression  PARoxetine (PAXIL) 20 MG tablet   5.  Chronic obstructive pulmonary disease, unspecified COPD type ([facility]  azithromycin (ZITHROMAX) 250 MG tablet    budesonide-formoterol (SYMBICORT) 160-4.5 MCG/ACT AERO         Echo in future  pepcid or tums as needed GI symptoms  Discussed ganglion cyst referred to Ortho for further treatment  Coping mechanisms  Again reiterated I will not be giving him any Ativan or benzodiazepines or narcotics given his history  Good nutrition hydration  Avoid triggers of symptoms  Needs to see psychiatry  He and his significant other really would benefit from counseling  Follow-up with me 2 weeks sooner if problems  Really needs to decrease his tobacco use    Daryle Lango, PA  9/10/2020 4:54 PM EDT    (Pleasenote that portions of this

## 2020-10-07 ENCOUNTER — VIRTUAL VISIT (OUTPATIENT)
Dept: FAMILY MEDICINE CLINIC | Age: 40
End: 2020-10-07
Payer: MEDICARE

## 2020-10-07 PROCEDURE — 99214 OFFICE O/P EST MOD 30 MIN: CPT | Performed by: PHYSICIAN ASSISTANT

## 2020-10-07 PROCEDURE — G8427 DOCREV CUR MEDS BY ELIG CLIN: HCPCS | Performed by: PHYSICIAN ASSISTANT

## 2020-10-07 RX ORDER — METOPROLOL SUCCINATE 25 MG/1
25 TABLET, EXTENDED RELEASE ORAL DAILY
Qty: 90 TABLET | Refills: 1 | Status: SHIPPED | OUTPATIENT
Start: 2020-10-07 | End: 2020-12-11

## 2020-10-07 RX ORDER — CEFDINIR 300 MG/1
300 CAPSULE ORAL 2 TIMES DAILY
Qty: 20 CAPSULE | Refills: 0 | Status: SHIPPED | OUTPATIENT
Start: 2020-10-07 | End: 2020-10-17

## 2020-10-07 RX ORDER — BENZONATATE 200 MG/1
200 CAPSULE ORAL 3 TIMES DAILY PRN
Qty: 30 CAPSULE | Refills: 0 | Status: SHIPPED | OUTPATIENT
Start: 2020-10-07 | End: 2020-10-14

## 2020-10-07 RX ORDER — PAROXETINE 30 MG/1
30 TABLET, FILM COATED ORAL 2 TIMES DAILY
COMMUNITY
End: 2021-10-18 | Stop reason: SDUPTHER

## 2020-10-07 RX ORDER — MIRTAZAPINE 30 MG/1
30 TABLET, FILM COATED ORAL NIGHTLY
Qty: 30 TABLET | Refills: 5 | Status: SHIPPED | OUTPATIENT
Start: 2020-10-07 | End: 2020-12-11

## 2020-10-07 RX ORDER — DIAZEPAM 5 MG/1
5 TABLET ORAL 2 TIMES DAILY PRN
COMMUNITY
End: 2021-12-21

## 2020-10-07 RX ORDER — FLUTICASONE PROPIONATE 50 MCG
2 SPRAY, SUSPENSION (ML) NASAL DAILY
Qty: 1 BOTTLE | Refills: 5 | Status: SHIPPED | OUTPATIENT
Start: 2020-10-07 | End: 2021-10-18 | Stop reason: SDUPTHER

## 2020-10-07 ASSESSMENT — ENCOUNTER SYMPTOMS
COUGH: 1
SORE THROAT: 0
NAUSEA: 0
VOMITING: 0
ABDOMINAL PAIN: 0
TROUBLE SWALLOWING: 0
DIARRHEA: 1
WHEEZING: 0
SHORTNESS OF BREATH: 0

## 2020-10-07 ASSESSMENT — PATIENT HEALTH QUESTIONNAIRE - PHQ9
1. LITTLE INTEREST OR PLEASURE IN DOING THINGS: 0
SUM OF ALL RESPONSES TO PHQ QUESTIONS 1-9: 0
SUM OF ALL RESPONSES TO PHQ QUESTIONS 1-9: 0
2. FEELING DOWN, DEPRESSED OR HOPELESS: 0
SUM OF ALL RESPONSES TO PHQ9 QUESTIONS 1 & 2: 0

## 2020-10-07 NOTE — PROGRESS NOTES
10/7/2020    TELEHEALTH EVALUATION -- Audio/Visual (During PNVKU-04 public health emergency)    HPI:    Savannah Girard (:  1980) has requested an audio/video evaluation for the following concern(s):    Is seen following up on mood and other health issues. At this time he states he has been off of his Remeron for at least 3 to 4 days so his anxiety has been worse. Still having panic attacks but they are not as full-blown as they used to be. States he is taking Paxil 30 mg twice daily. Tolerates that without any problems. Has a recent cough and cold the past several days. His significant other is ill with this to. Has a lot of coughing hacking in the morning. No change in taste or smell. Has had a headache. Has had congestion the past few days is taking Singulair and Claritin and needs refills on his Flonase. Denies fever chills but really has not checked either. Had diarrhea but  relates to his medications. He thinks it is because his Suboxone is being decreased. Never followed up with orthopedics for his ganglion cyst was afraid of having surgery with the inability to take narcotics since he is on Suboxone. Review of Systems   Constitutional: Negative for activity change, appetite change, chills, fatigue and fever. HENT: Positive for congestion and postnasal drip. Negative for sore throat and trouble swallowing. Respiratory: Positive for cough. Negative for shortness of breath and wheezing. Cardiovascular: Negative for chest pain and palpitations. Gastrointestinal: Positive for diarrhea. Negative for abdominal pain, nausea and vomiting. He has not had diarrhea in 5 days. Genitourinary: Negative for difficulty urinating and dysuria. Musculoskeletal: Negative for arthralgias and myalgias. Skin: Negative for rash.        having an acneiform type breakout on his face chest and back. Never had anything like this. He is picking at them a lot he admits.   Wondering what to do. Neurological: Positive for headaches. Negative for dizziness, light-headedness and numbness. Psychiatric/Behavioral: Positive for agitation and sleep disturbance. Negative for self-injury and suicidal ideas. The patient is nervous/anxious. Prior to Visit Medications    Medication Sig Taking? Authorizing Provider   PARoxetine (PAXIL) 30 MG tablet Take 30 mg by mouth 2 times daily Yes Historical Provider, MD   diazePAM (VALIUM) 5 MG tablet Take 5 mg by mouth 2 times daily as needed for Anxiety.  Yes Historical Provider, MD   benzonatate (TESSALON) 200 MG capsule Take 1 capsule by mouth 3 times daily as needed for Cough Yes NATALIIA Lomas   mirtazapine (REMERON) 30 MG tablet Take 1 tablet by mouth nightly Yes NATALIIA Lomas   cefdinir (OMNICEF) 300 MG capsule Take 1 capsule by mouth 2 times daily for 10 days Yes NATALIIA Lomas   fluticasone [facility]) 50 MCG/ACT nasal spray 2 sprays by Each Nostril route daily Yes NATALIIA Lomas   budesonide-formoterol (SYMBICORT) 160-4.5 MCG/ACT AERO Inhale 2 puffs into the lungs 2 times daily Yes NATALIIA Lomas   mirtazapine (REMERON SOLTAB) 30 MG disintegrating tablet Take 1 tablet by mouth nightly Yes NATALIIA Lomas   montelukast (SINGULAIR) 10 MG tablet Take 1 tablet by mouth nightly Yes NATALIIA Lomas   metoprolol succinate (TOPROL XL) 25 MG extended release tablet Take 1 tablet by mouth daily Yes NATALIIA Lomas   albuterol sulfate HFA (VENTOLIN HFA) 108 (90 Base) MCG/ACT inhaler Inhale 2 puffs into the lungs every 4 hours as needed for Wheezing Yes NATALIIA Lomas   RA ALLERGY RELIEF 180 MG tablet take 1 tablet by mouth once daily Yes Elyssa Garcia MD   SUBOXONE 8-2 MG FILM SL film  Yes Historical Provider, MD   ibuprofen (ADVIL;MOTRIN) 600 MG tablet Take 1 tablet by mouth every 6 hours as needed for Pain Yes Nikita Tanner MD       Social History     Tobacco Use    Smoking status: Current Every Day Smoker     Packs/day: 2.00     Types: Cigarettes    Smokeless tobacco: Former User     Types: Chew    Tobacco comment: Looking at [address] [place] [address] to try to get away from it  used to smoke 3 packs a day   Substance Use Topics    Alcohol use: No     Comment: socailly    Drug use: No     Comment: history of drug abuse         Allergies   Allergen Reactions    Ciprofloxacin    ,   Past Medical History:   Diagnosis Date    Anxiety     Has been hospitalized in past    Chronic back pain     Depression     Has been hospitalized in past    Irritable bowel syndrome    ,   Past Surgical History:   Procedure Laterality Date    APPENDECTOMY      HERNIA REPAIR      1 left; 2 on right    SINUS SURGERY      TONSILLECTOMY      TYMPANOSTOMY TUBE PLACEMENT      3 times prior   ,   Social History     Tobacco Use    Smoking status: Current Every Day Smoker     Packs/day: 2.00     Types: Cigarettes    Smokeless tobacco: Former User     Types: Chew    Tobacco comment: Looking at [address] [place] [address] to try to get away from it  used to smoke 3 packs a day   Substance Use Topics    Alcohol use: No     Comment: socailly    Drug use: No     Comment: history of drug abuse    ,   Family History   Problem Relation Age of Onset    Other Mother         Anxiety/Depression    Mental Illness Mother         anxiety and depression    High Blood Pressure Mother     Heart Attack Father         MI age 29    Heart Disease Father     Other Father         PUD  Depression    Heart Attack Maternal Uncle     Heart Attack Paternal Uncle     Other Maternal Uncle         Suicide    Other Paternal Uncle         Suicide    Mental Illness Sister         anxiety and depression   ,   Immunization History   Administered Date(s) Administered    Hepatitis A Adult (Havrix, Vaqta) 04/06/2011    Hepatitis B 04/06/2011, 05/11/2011    Influenza A (H5N1) Monovalent Vaccine, Adjuvanted-2013 10/27/2009    Influenza Virus Vaccine 11/24/2006, 10/23/2008, 04/06/2011, 09/03/2018    Influenza, Koleen Jaden, IM, PF (6 mo and older Fluzone, Flulaval, Fluarix, and 3 yrs and older Afluria) 10/31/2017, 11/13/2019    Meningococcal MPSV4 (Menomune) 04/06/2011    Plague 04/06/2011    Tdap (Boostrix, Adacel) 04/06/2011   ,   Health Maintenance   Topic Date Due    Varicella vaccine (1 of 2 - 2-dose childhood series) 09/27/1981    Pneumococcal 0-64 years Vaccine (1 of 1 - PPSV23) 09/27/1986    HIV screen  09/27/1995    Flu vaccine (1) 09/01/2020    DTaP/Tdap/Td vaccine (2 - Td) 04/06/2021    Potassium monitoring  09/01/2021    Creatinine monitoring  09/01/2021    Lipid screen  09/01/2025    Hepatitis A vaccine  Aged Out    Hepatitis B vaccine  Aged Out    Hib vaccine  Aged Out    Meningococcal (ACWY) vaccine  Aged Out       PHYSICAL EXAMINATION:  [ INSTRUCTIONS:  \"[x]\" Indicates a positive item  \"[]\" Indicates a negative item  -- DELETE ALL ITEMS NOT EXAMINED]  Vital Signs: (As obtained by patient/caregiver or practitioner observation)    Blood pressure-  Heart rate-    Respiratory rate- WNL   Temperature-  Pulse oximetry-     Constitutional: [x] Appears well-developed and well-nourished [x] No apparent distress      [] Abnormal-   Mental status  [x] Alert and awake  [x] Oriented to person/place/time [x]Able to follow commands      Eyes:  EOM    [x]  Normal  [] Abnormal-  Sclera  [x]  Normal  [] Abnormal -         Discharge [x]  None visible  [] Abnormal -    HENT:   [x] Normocephalic, atraumatic.   [x] Abnormal   [] Mouth/Throat: Mucous membranes are moist.     External Ears [x] Normal  [] Abnormal-     Neck: [x] No visualized mass     Pulmonary/Chest: [x] Respiratory effort normal.  [x] No visualized signs of difficulty breathing or respiratory distress        [] Abnormal-      Musculoskeletal:   [x] Normal gait with no signs of ataxia         [x] Normal range of motion of neck        [] Abnormal-       Neurological:        [x] No Facial Asymmetry (Cranial nerve 7 motor function) (limited exam to video visit)          [x] No gaze palsy        [] Abnormal-         Skin:        [x] No significant exanthematous lesions or discoloration noted on facial skin         [] Abnormal-            Psychiatric:       [x] Normal Affect [x] No Hallucinations        [] Abnormal-     Other pertinent observable physical exam findings-     ASSESSMENT/PLAN:  1. Cough    - benzonatate (TESSALON) 200 MG capsule; Take 1 capsule by mouth 3 times daily as needed for Cough  Dispense: 30 capsule; Refill: 0    2. Chronic obstructive pulmonary disease, unspecified COPD type ([facility]    - benzonatate (TESSALON) 200 MG capsule; Take 1 capsule by mouth 3 times daily as needed for Cough  Dispense: 30 capsule; Refill: 0  - cefdinir (OMNICEF) 300 MG capsule; Take 1 capsule by mouth 2 times daily for 10 days  Dispense: 20 capsule; Refill: 0  - fluticasone (FLONASE) 50 MCG/ACT nasal spray; 2 sprays by Each Nostril route daily  Dispense: 1 Bottle; Refill: 5    3. Anxiety with depression    - mirtazapine (REMERON) 30 MG tablet; Take 1 tablet by mouth nightly  Dispense: 30 tablet; Refill: 5    4. Viral URI    - fluticasone (FLONASE) 50 MCG/ACT nasal spray; 2 sprays by Each Nostril route daily  Dispense: 1 Bottle; Refill: 5    5. Tobacco use      6. Seasonal allergic rhinitis due to pollen    - fluticasone (FLONASE) 50 MCG/ACT nasal spray; 2 sprays by Each Nostril route daily  Dispense: 1 Bottle; Refill: 5    7. H/O: substance abuse ([facility]      8. Essential hypertension      9. Ganglion cyst of dorsum of right wrist      10. Smoker      11. Positive depression screening      12. Anxiety      We will refill his Flonase will add Tessalon Perles  Recommended Mucinex along with Claritin and Singulair.   Fluids rest non-smoking would help  We will see if Omnicef helps the cough and the skin changes  No picking  Coping mechanisms   Remeron and Toprol will be refilled take as prescribed  Reassured he should do fine with having a ganglion cyst removed he does not need significant narcotics for something like that he will think about it he stated  Follow-up 2 weeks sooner if problems    Return in about 2 weeks (around 10/21/2020). Mariya Mercado is a 36 y.o. male being evaluated by a Virtual Visit (video visit) encounter to address concerns as mentioned above. A caregiver was present when appropriate. Due to this being a TeleHealth encounter (During KUZZZ-45 public health emergency), evaluation of the following organ systems was limited: Vitals/Constitutional/EENT/Resp/CV/GI//MS/Neuro/Skin/Heme-Lymph-Imm. Pursuant to the emergency declaration under the [address] [place] [address], [address] [place] [address] authority and the [employer] Act, this Virtual Visit was conducted with patient's (and/or legal guardian's) consent, to reduce the patient's risk of exposure to COVID-19 and provide necessary medical care. The patient (and/or legal guardian) has also been advised to contact this office for worsening conditions or problems, and seek emergency medical treatment and/or call 911 if deemed necessary. Patient identification was verified at the start of the visit: Yes    Total time spent on this encounter: 25-30 minutes    Services were provided through a video synchronous discussion virtually to substitute for in-person clinic visit. Patient and provider were located at their individual homes. --NATALIIA Lomas on 10/7/2020 at 6:36 PM    An electronic signature was used to authenticate this note.

## 2020-10-26 RX ORDER — CEFDINIR 300 MG/1
300 CAPSULE ORAL 2 TIMES DAILY
Qty: 20 CAPSULE | Refills: 0 | Status: SHIPPED | OUTPATIENT
Start: 2020-10-26 | End: 2020-11-05

## 2020-10-26 RX ORDER — BENZONATATE 200 MG/1
200 CAPSULE ORAL 3 TIMES DAILY PRN
Qty: 30 CAPSULE | Refills: 0 | Status: SHIPPED | OUTPATIENT
Start: 2020-10-26 | End: 2020-11-05 | Stop reason: SDUPTHER

## 2020-10-30 ENCOUNTER — TELEPHONE (OUTPATIENT)
Dept: FAMILY MEDICINE CLINIC | Age: 40
End: 2020-10-30

## 2020-11-05 RX ORDER — BENZONATATE 200 MG/1
200 CAPSULE ORAL 3 TIMES DAILY PRN
Qty: 30 CAPSULE | Refills: 0 | Status: SHIPPED | OUTPATIENT
Start: 2020-11-05 | End: 2020-11-16 | Stop reason: SDUPTHER

## 2020-11-16 RX ORDER — BENZONATATE 200 MG/1
200 CAPSULE ORAL 3 TIMES DAILY PRN
Qty: 30 CAPSULE | Refills: 0 | Status: SHIPPED | OUTPATIENT
Start: 2020-11-16 | End: 2020-12-08 | Stop reason: SDUPTHER

## 2020-11-30 RX ORDER — MONTELUKAST SODIUM 10 MG/1
10 TABLET ORAL NIGHTLY
Qty: 30 TABLET | Refills: 3 | Status: SHIPPED | OUTPATIENT
Start: 2020-11-30 | End: 2021-03-24 | Stop reason: SDUPTHER

## 2020-12-08 RX ORDER — BENZONATATE 200 MG/1
200 CAPSULE ORAL 3 TIMES DAILY PRN
Qty: 30 CAPSULE | Refills: 0 | Status: SHIPPED | OUTPATIENT
Start: 2020-12-08 | End: 2020-12-15

## 2020-12-11 ENCOUNTER — OFFICE VISIT (OUTPATIENT)
Dept: FAMILY MEDICINE CLINIC | Age: 40
End: 2020-12-11
Payer: MEDICARE

## 2020-12-11 ENCOUNTER — HOSPITAL ENCOUNTER (OUTPATIENT)
Dept: LAB | Age: 40
Discharge: HOME | End: 2020-12-11
Payer: MEDICARE

## 2020-12-11 ENCOUNTER — HOSPITAL ENCOUNTER (OUTPATIENT)
Dept: GENERAL RADIOLOGY | Age: 40
Discharge: HOME | End: 2020-12-13
Payer: MEDICARE

## 2020-12-11 VITALS
HEART RATE: 104 BPM | WEIGHT: 193 LBS | OXYGEN SATURATION: 96 % | SYSTOLIC BLOOD PRESSURE: 112 MMHG | DIASTOLIC BLOOD PRESSURE: 80 MMHG | BODY MASS INDEX: 27.02 KG/M2 | HEIGHT: 71 IN

## 2020-12-11 LAB
ABSOLUTE EOS #: 5.88 K/UL (ref 0–0.4)
ABSOLUTE IMMATURE GRANULOCYTE: 0.24 K/UL (ref 0–0.3)
ABSOLUTE LYMPH #: 3.06 K/UL (ref 1–4.8)
ABSOLUTE MONO #: 0.47 K/UL (ref 0.1–1.2)
ALBUMIN SERPL-MCNC: 4.5 G/DL (ref 3.5–5.2)
ALBUMIN/GLOBULIN RATIO: 1.1 (ref 1–2.5)
ALP BLD-CCNC: 134 U/L (ref 40–129)
ALT SERPL-CCNC: 16 U/L (ref 5–41)
ANION GAP SERPL CALCULATED.3IONS-SCNC: 11 MMOL/L (ref 9–17)
AST SERPL-CCNC: 18 U/L
BASOPHILS # BLD: 0 % (ref 0–2)
BASOPHILS ABSOLUTE: 0 K/UL (ref 0–0.2)
BILIRUB SERPL-MCNC: 0.14 MG/DL (ref 0.3–1.2)
BUN BLDV-MCNC: 6 MG/DL (ref 6–20)
BUN/CREAT BLD: 7 (ref 9–20)
CALCIUM SERPL-MCNC: 9.9 MG/DL (ref 8.6–10.4)
CHLORIDE BLD-SCNC: 103 MMOL/L (ref 98–107)
CO2: 27 MMOL/L (ref 20–31)
CREAT SERPL-MCNC: 0.86 MG/DL (ref 0.7–1.2)
DIFFERENTIAL TYPE: ABNORMAL
EOSINOPHILS RELATIVE PERCENT: 25 % (ref 1–8)
GFR AFRICAN AMERICAN: >60 ML/MIN
GFR NON-AFRICAN AMERICAN: >60 ML/MIN
GFR SERPL CREATININE-BSD FRML MDRD: ABNORMAL ML/MIN/{1.73_M2}
GFR SERPL CREATININE-BSD FRML MDRD: ABNORMAL ML/MIN/{1.73_M2}
GLUCOSE BLD-MCNC: 102 MG/DL (ref 70–99)
HCT VFR BLD CALC: 50.6 % (ref 40.7–50.3)
HEMOGLOBIN: 16.4 G/DL (ref 13–17)
IMMATURE GRANULOCYTES: 1 %
LYMPHOCYTES # BLD: 13 % (ref 15–43)
MCH RBC QN AUTO: 31.1 PG (ref 25.2–33.5)
MCHC RBC AUTO-ENTMCNC: 32.4 G/DL (ref 25.2–33.5)
MCV RBC AUTO: 95.8 FL (ref 82.6–102.9)
MONOCYTES # BLD: 2 % (ref 6–14)
MORPHOLOGY: ABNORMAL
MORPHOLOGY: ABNORMAL
NRBC AUTOMATED: 0 PER 100 WBC
PDW BLD-RTO: 14 % (ref 11.8–14.4)
PLATELET # BLD: 345 K/UL (ref 138–453)
PLATELET ESTIMATE: ABNORMAL
PMV BLD AUTO: 8.9 FL (ref 8.1–13.5)
POTASSIUM SERPL-SCNC: 4.2 MMOL/L (ref 3.7–5.3)
RBC # BLD: 5.28 M/UL (ref 4.21–5.77)
RBC # BLD: ABNORMAL 10*6/UL
SEG NEUTROPHILS: 59 % (ref 44–74)
SEGMENTED NEUTROPHILS ABSOLUTE COUNT: 13.85 K/UL (ref 1.5–8.1)
SODIUM BLD-SCNC: 141 MMOL/L (ref 135–144)
TOTAL PROTEIN: 8.7 G/DL (ref 6.4–8.3)
WBC # BLD: 23.5 K/UL (ref 3.5–11.3)
WBC # BLD: ABNORMAL 10*3/UL

## 2020-12-11 PROCEDURE — 86695 HERPES SIMPLEX TYPE 1 TEST: CPT

## 2020-12-11 PROCEDURE — 80053 COMPREHEN METABOLIC PANEL: CPT

## 2020-12-11 PROCEDURE — 71046 X-RAY EXAM CHEST 2 VIEWS: CPT

## 2020-12-11 PROCEDURE — G8926 SPIRO NO PERF OR DOC: HCPCS | Performed by: PHYSICIAN ASSISTANT

## 2020-12-11 PROCEDURE — 84270 ASSAY OF SEX HORMONE GLOBUL: CPT

## 2020-12-11 PROCEDURE — 36415 COLL VENOUS BLD VENIPUNCTURE: CPT

## 2020-12-11 PROCEDURE — 99215 OFFICE O/P EST HI 40 MIN: CPT | Performed by: PHYSICIAN ASSISTANT

## 2020-12-11 PROCEDURE — 86694 HERPES SIMPLEX NES ANTBDY: CPT

## 2020-12-11 PROCEDURE — G8427 DOCREV CUR MEDS BY ELIG CLIN: HCPCS | Performed by: PHYSICIAN ASSISTANT

## 2020-12-11 PROCEDURE — G8484 FLU IMMUNIZE NO ADMIN: HCPCS | Performed by: PHYSICIAN ASSISTANT

## 2020-12-11 PROCEDURE — G8419 CALC BMI OUT NRM PARAM NOF/U: HCPCS | Performed by: PHYSICIAN ASSISTANT

## 2020-12-11 PROCEDURE — 84403 ASSAY OF TOTAL TESTOSTERONE: CPT

## 2020-12-11 PROCEDURE — 86780 TREPONEMA PALLIDUM: CPT

## 2020-12-11 PROCEDURE — 4004F PT TOBACCO SCREEN RCVD TLK: CPT | Performed by: PHYSICIAN ASSISTANT

## 2020-12-11 PROCEDURE — 85025 COMPLETE CBC W/AUTO DIFF WBC: CPT

## 2020-12-11 PROCEDURE — 3023F SPIROM DOC REV: CPT | Performed by: PHYSICIAN ASSISTANT

## 2020-12-11 PROCEDURE — U0003 INFECTIOUS AGENT DETECTION BY NUCLEIC ACID (DNA OR RNA); SEVERE ACUTE RESPIRATORY SYNDROME CORONAVIRUS 2 (SARS-COV-2) (CORONAVIRUS DISEASE [COVID-19]), AMPLIFIED PROBE TECHNIQUE, MAKING USE OF HIGH THROUGHPUT TECHNOLOGIES AS DESCRIBED BY CMS-2020-01-R: HCPCS

## 2020-12-11 PROCEDURE — 87491 CHLMYD TRACH DNA AMP PROBE: CPT

## 2020-12-11 PROCEDURE — 86696 HERPES SIMPLEX TYPE 2 TEST: CPT

## 2020-12-11 PROCEDURE — 87591 N.GONORRHOEAE DNA AMP PROB: CPT

## 2020-12-11 PROCEDURE — 87389 HIV-1 AG W/HIV-1&-2 AB AG IA: CPT

## 2020-12-11 RX ORDER — CEPHALEXIN 500 MG/1
500 CAPSULE ORAL 2 TIMES DAILY
Qty: 20 CAPSULE | Refills: 0 | Status: SHIPPED | OUTPATIENT
Start: 2020-12-11 | End: 2020-12-21

## 2020-12-11 RX ORDER — AZITHROMYCIN 500 MG/1
500 TABLET, FILM COATED ORAL DAILY
Qty: 10 TABLET | Refills: 0 | Status: SHIPPED | OUTPATIENT
Start: 2020-12-11 | End: 2020-12-21

## 2020-12-11 ASSESSMENT — ENCOUNTER SYMPTOMS
SINUS PAIN: 1
SORE THROAT: 0
VOMITING: 0
RHINORRHEA: 1
DIARRHEA: 1
NAUSEA: 0
SINUS PRESSURE: 1
COUGH: 1
WHEEZING: 1
SHORTNESS OF BREATH: 1

## 2020-12-11 ASSESSMENT — PATIENT HEALTH QUESTIONNAIRE - PHQ9
SUM OF ALL RESPONSES TO PHQ QUESTIONS 1-9: 0
2. FEELING DOWN, DEPRESSED OR HOPELESS: 0
SUM OF ALL RESPONSES TO PHQ9 QUESTIONS 1 & 2: 0
1. LITTLE INTEREST OR PLEASURE IN DOING THINGS: 0

## 2020-12-11 NOTE — PROGRESS NOTES
[facility]    Subjective:      Patient ID: Doris Barkley is a 36 y.o. y.o. male. Patient is seen for follow up on various health issues. Has a cough. States this is his nl cough. He is having pain discomfort in right upper abdomen epigastric area. Has diarrhea the past few weeks and relates to decreasing suboxone dose. He is seeing recovery services. They have him on valium right now bid. Goes to [facility] recover services too. His therapist is there.          Past Medical History:   Diagnosis Date    Anxiety     Has been hospitalized in past    Chronic back pain     Depression     Has been hospitalized in past    Irritable bowel syndrome        Past Surgical History:   Procedure Laterality Date    APPENDECTOMY      HERNIA REPAIR      1 left; 2 on right    SINUS SURGERY      TONSILLECTOMY      TYMPANOSTOMY TUBE PLACEMENT      3 times prior       Family History   Problem Relation Age of Onset    Other Mother         Anxiety/Depression    Mental Illness Mother         anxiety and depression    High Blood Pressure Mother     Heart Attack Father         MI age 29    Heart Disease Father     Other Father         PUD  Depression    Heart Attack Maternal Uncle     Heart Attack Paternal Uncle     Other Maternal Uncle         Suicide    Other Paternal Uncle         Suicide    Mental Illness Sister         anxiety and depression       Allergies   Allergen Reactions    Ciprofloxacin        Current Outpatient Medications   Medication Sig Dispense Refill    azithromycin (ZITHROMAX) 500 MG tablet Take 1 tablet by mouth daily for 10 days 10 tablet 0    cephALEXin (KEFLEX) 500 MG capsule Take 1 capsule by mouth 2 times daily for 10 days 20 capsule 0    montelukast (SINGULAIR) 10 MG tablet Take 1 tablet by mouth nightly 30 tablet 3    PARoxetine (PAXIL) 30 MG tablet Take 30 mg by mouth 2 times daily  diazePAM (VALIUM) 5 MG tablet Take 5 mg by mouth 2 times daily as needed for Anxiety.  fluticasone (FLONASE) 50 MCG/ACT nasal spray 2 sprays by Each Nostril route daily 1 Bottle 5    budesonide-formoterol (SYMBICORT) 160-4.5 MCG/ACT AERO Inhale 2 puffs into the lungs 2 times daily 1 Inhaler 5    mirtazapine (REMERON SOLTAB) 30 MG disintegrating tablet Take 1 tablet by mouth nightly 30 tablet 2    metoprolol succinate (TOPROL XL) 25 MG extended release tablet Take 1 tablet by mouth daily 30 tablet 3    albuterol sulfate HFA (VENTOLIN HFA) 108 (90 Base) MCG/ACT inhaler Inhale 2 puffs into the lungs every 4 hours as needed for Wheezing 1 Inhaler 5    RA ALLERGY RELIEF 180 MG tablet take 1 tablet by mouth once daily 30 tablet 5    SUBOXONE 8-2 MG FILM SL film       ibuprofen (ADVIL;MOTRIN) 600 MG tablet Take 1 tablet by mouth every 6 hours as needed for Pain 30 tablet 0    nystatin (MYCOSTATIN) 005363 UNIT/ML suspension Take 5 mLs by mouth 3 times daily Swish and spit may swallow 100 mL 1     No current facility-administered medications for this visit. Review of Systems   Constitutional: Positive for fatigue. Negative for appetite change, chills and fever. HENT: Positive for congestion, postnasal drip, rhinorrhea, sinus pressure and sinus pain. Negative for sneezing and sore throat. Can taste and smell. Has sinus pain in left frontal area. Respiratory: Positive for cough, shortness of breath and wheezing. States is coughing all night. Is still smoking. States taking his inhaler. Cardiovascular: Negative for chest pain and palpitations. Chest hurts with cough spells. Gastrointestinal: Positive for diarrhea. Negative for nausea and vomiting. Genitourinary: Negative. Musculoskeletal: Positive for myalgias. States has been achy the past month. Skin: Negative for rash. Neurological: Positive for headaches. Negative for light-headedness and numbness. Psychiatric/Behavioral: Positive for sleep disturbance. The patient is nervous/anxious. Objective:      /80   Pulse 104   Ht 5' 11\" (1.803 m)   Wt 193 lb (87.5 kg)   SpO2 96%   BMI 26.92 kg/m²     Physical Exam  Vitals signs and nursing note reviewed. Constitutional:       General: He is not in acute distress. Appearance: He is well-developed. He is ill-appearing. Comments: Looks older than his stated age   HENT:      Head: Normocephalic and atraumatic. Comments: TMs are dull. Nose turbinates are boggy irritated. Postnasal drip noted. Right Ear: External ear normal.      Left Ear: External ear normal.      Nose: No rhinorrhea. Mouth/Throat:      Mouth: Mucous membranes are moist.      Pharynx: Posterior oropharyngeal erythema present. No oropharyngeal exudate. Eyes:      General: No scleral icterus. Neck:      Musculoskeletal: Normal range of motion and neck supple. No neck rigidity or muscular tenderness. Comments: Thyroid nontender no palpable. Cardiovascular:      Rate and Rhythm: Normal rate and regular rhythm. Heart sounds: Normal heart sounds. No murmur. No gallop. Pulmonary:      Effort: Pulmonary effort is normal. No respiratory distress. Breath sounds: Wheezing and rhonchi present. No rales. Comments: Scattered wheezing and rhonchi noted throughout the lung fields did not clear with cough. Abdominal:      General: Bowel sounds are normal. There is no distension. Palpations: Abdomen is soft. There is no mass. Tenderness: There is no abdominal tenderness. There is no guarding or rebound. Hernia: No hernia is present. Musculoskeletal:         General: No swelling, tenderness, deformity or signs of injury. Right lower leg: No edema. Left lower leg: No edema. Lymphadenopathy:      Cervical: No cervical adenopathy. Skin:     General: Skin is warm and dry. Findings: No erythema or rash.    Neurological: General: No focal deficit present. Mental Status: He is alert and oriented to person, place, and time. Sensory: No sensory deficit. Gait: Gait normal.   Psychiatric:         Mood and Affect: Mood normal.      Comments: he is anxious. Is not tearful. He is coherent. Speech is normal responds appropriately when question. No evidence for suicide homicidal ideation. Xr Chest (2 Vw)    Result Date: 12/11/2020  EXAMINATION: TWO XRAY VIEWS OF THE CHEST 12/11/2020 5:01 pm COMPARISON: 09/01/2020 HISTORY: ORDERING SYSTEM PROVIDED HISTORY: Cough with exposure to COVID-19 virus TECHNOLOGIST PROVIDED HISTORY: cough copd Reason for Exam: Cough; wheezing for several months; COPD FINDINGS: The lungs are without acute focal process. There is no effusion or pneumothorax. The cardiomediastinal silhouette is stable. The osseous structures are stable. No acute process. Assessment & Plan:     1. Cough with exposure to COVID-19 virus    - XR CHEST STANDARD (2 VW); Future  - COVID-19 Ambulatory; Future    2. Acute bronchitis, unspecified organism    - Comprehensive Metabolic Panel; Future  - CBC With Auto Differential; Future  - XR CHEST STANDARD (2 VW); Future  - azithromycin (ZITHROMAX) 500 MG tablet; Take 1 tablet by mouth daily for 10 days  Dispense: 10 tablet; Refill: 0  - cephALEXin (KEFLEX) 500 MG capsule; Take 1 capsule by mouth 2 times daily for 10 days  Dispense: 20 capsule; Refill: 0  - COVID-19 Ambulatory; Future    3. Exposure to COVID-19 virus    - Comprehensive Metabolic Panel; Future  - CBC With Auto Differential; Future  - XR CHEST STANDARD (2 VW); Future  - COVID-19 Ambulatory; Future    4. Chronic obstructive pulmonary disease, unspecified COPD type ([facility] [address]    - Comprehensive Metabolic Panel; Future  - CBC With Auto Differential; Future  - XR CHEST STANDARD (2 VW);  Future - azithromycin (ZITHROMAX) 500 MG tablet; Take 1 tablet by mouth daily for 10 days  Dispense: 10 tablet; Refill: 0  - cephALEXin (KEFLEX) 500 MG capsule; Take 1 capsule by mouth 2 times daily for 10 days  Dispense: 20 capsule; Refill: Kerri Ivory MD, Pulmonology, [place]  - COVID-19 Ambulatory; Future    5. Cough    - XR CHEST STANDARD (2 VW); Future  - azithromycin (ZITHROMAX) 500 MG tablet; Take 1 tablet by mouth daily for 10 days  Dispense: 10 tablet; Refill: 0  - cephALEXin (KEFLEX) 500 MG capsule; Take 1 capsule by mouth 2 times daily for 10 days  Dispense: 20 capsule; Refill: 0  - COVID-19 Ambulatory; Future    6. Erectile dysfunction, unspecified erectile dysfunction type    - Testosterone Free Bio Total; Future  - Edgar Adan MD, Urology, [place]    7. Exposure to sexually transmitted disease (STD)    - Chlamydia/GC DNA, Urine; Future  - HIV Screen; Future  - T. pallidum Ab; Future  - Herpes Profile; Future    8.  Smoker    - Baylee Rivas MD, Pulmonology, [place]    he notified of all results  Answered his questions  Coping mechanisms  He and his significant other need to work on the relationship  Smoking cessation  Follow-up with his specialist as scheduled  He is seeing psychiatry supposedly he is on Valium he really has no business being on this  Referred to pulmonary since his lungs are never clear when I see him in the office  Discussed quarantine  Follow-up 1 month sooner if problems    Luis Nicole [place]  12/20/2020 4:22 PM EST    (Pleasenote that portions of this note were completed with a voice recognition program.Efforts were made to edit the dictations but occasionally words are mis-transcribed.)

## 2020-12-12 LAB
HIV AG/AB: NONREACTIVE
SEX HORMONE BINDING GLOBULIN: 49 NMOL/L (ref 11–80)
T. PALLIDUM, IGG: NONREACTIVE
TESTOSTERONE FREE-NONMALE: 68.2 PG/ML (ref 47–244)
TESTOSTERONE TOTAL: 430 NG/DL (ref 220–1000)
TESTOSTERONE, BIOAVAILABLE: 159.8 NG/DL (ref 130–680)

## 2020-12-14 ENCOUNTER — PATIENT MESSAGE (OUTPATIENT)
Dept: FAMILY MEDICINE CLINIC | Age: 40
End: 2020-12-14

## 2020-12-14 ENCOUNTER — HOSPITAL ENCOUNTER (OUTPATIENT)
Age: 40
Setting detail: SPECIMEN
Discharge: HOME | End: 2020-12-14
Payer: MEDICARE

## 2020-12-14 LAB
ABSOLUTE EOS #: 2.81 K/UL (ref 0–0.44)
ABSOLUTE IMMATURE GRANULOCYTE: 0.5 K/UL (ref 0–0.3)
ABSOLUTE LYMPH #: 4.95 K/UL (ref 1.1–3.7)
ABSOLUTE MONO #: 0.99 K/UL (ref 0.1–1.2)
BASOPHILS # BLD: 2 % (ref 0–2)
BASOPHILS ABSOLUTE: 0.33 K/UL (ref 0–0.2)
C. TRACHOMATIS DNA ,URINE: NEGATIVE
DIFFERENTIAL TYPE: ABNORMAL
EOSINOPHILS RELATIVE PERCENT: 17 % (ref 1–4)
HCT VFR BLD CALC: 48.6 % (ref 40.7–50.3)
HEMOGLOBIN: 15.7 G/DL (ref 13–17)
IMMATURE GRANULOCYTES: 3 %
LYMPHOCYTES # BLD: 30 % (ref 24–43)
MCH RBC QN AUTO: 31.4 PG (ref 25.2–33.5)
MCHC RBC AUTO-ENTMCNC: 32.3 G/DL (ref 25.2–33.5)
MCV RBC AUTO: 97.2 FL (ref 82.6–102.9)
MONOCYTES # BLD: 6 % (ref 3–12)
MORPHOLOGY: ABNORMAL
N. GONORRHOEAE DNA, URINE: NEGATIVE
NRBC AUTOMATED: 0 PER 100 WBC
PDW BLD-RTO: 14.4 % (ref 11.8–14.4)
PLATELET # BLD: 326 K/UL (ref 138–453)
PLATELET ESTIMATE: ABNORMAL
PMV BLD AUTO: 9.6 FL (ref 8.1–13.5)
RBC # BLD: 5 M/UL (ref 4.21–5.77)
RBC # BLD: ABNORMAL 10*6/UL
SARS-COV-2, NAA: NOT DETECTED
SEG NEUTROPHILS: 42 % (ref 36–65)
SEGMENTED NEUTROPHILS ABSOLUTE COUNT: 6.92 K/UL (ref 1.5–8.1)
SPECIMEN DESCRIPTION: NORMAL
WBC # BLD: 16.5 K/UL (ref 3.5–11.3)
WBC # BLD: ABNORMAL 10*3/UL

## 2020-12-14 PROCEDURE — 36415 COLL VENOUS BLD VENIPUNCTURE: CPT

## 2020-12-14 PROCEDURE — 85025 COMPLETE CBC W/AUTO DIFF WBC: CPT

## 2020-12-15 ENCOUNTER — TELEPHONE (OUTPATIENT)
Dept: FAMILY MEDICINE CLINIC | Age: 40
End: 2020-12-15

## 2020-12-15 LAB
HERPES SIMPLEX VIRUS 1 IGG: 5.5
HERPES SIMPLEX VIRUS 2 IGG: 0.87
HERPES TYPE 1/2 IGM COMBINED: 1.26

## 2020-12-15 NOTE — TELEPHONE ENCOUNTER
From: Boo Beauchamp  To: NATALIIA Mendoza  Sent: 12/14/2020 9:50 PM EST  Subject: Non-Urgent Medical Question    Went into [facility] for pain under rib, they did nothing and we left early due to unsanitary conditions and lack of professionalism. When we got home, noticed bumbs in back of throat. Took a pic.  Never seen green before

## 2020-12-15 NOTE — TELEPHONE ENCOUNTER
Tried to call patient but had to [facility] for him to call for results

## 2020-12-31 ENCOUNTER — PATIENT MESSAGE (OUTPATIENT)
Dept: FAMILY MEDICINE CLINIC | Age: 40
End: 2020-12-31

## 2020-12-31 RX ORDER — BENZONATATE 200 MG/1
200 CAPSULE ORAL 3 TIMES DAILY PRN
Qty: 30 CAPSULE | Refills: 3 | Status: SHIPPED | OUTPATIENT
Start: 2020-12-31 | End: 2021-01-07

## 2020-12-31 NOTE — TELEPHONE ENCOUNTER
From: Velma Beauchamp  To: NATALIIA Camejo  Sent: 12/31/2020 3:06 PM EST  Subject: Non-Urgent Medical Question    It's the mucus being coughed up.  Can you send in a script for the benzoate?      ----- Message -----   NATALIIA Lee   Sent:12/31/2020 2:09 PM EST   To:Hardik Beauchamp   Subject:RE: Non-Urgent Medical Question    Hi,    If not feeling well need to come into flu clinic  ThanksWilliam      ----- Message -----   From:Hardik Beauchamp   Sent:12/31/2020 11:54 AM EST   To:NATALIIA Parr   Subject:Non-Urgent Medical Question    This is the stuff I've been coughing up

## 2020-12-31 NOTE — TELEPHONE ENCOUNTER
From: Zahra Beauchamp  To: NATALIIA Bran  Sent: 12/31/2020 11:54 AM EST  Subject: Non-Urgent Medical Question    This is the stuff I've been coughing up

## 2021-01-06 ENCOUNTER — OFFICE VISIT (OUTPATIENT)
Dept: PRIMARY CARE CLINIC | Age: 41
End: 2021-01-06
Payer: MEDICARE

## 2021-01-06 ENCOUNTER — APPOINTMENT (OUTPATIENT)
Dept: GENERAL RADIOLOGY | Age: 41
End: 2021-01-06
Payer: MEDICARE

## 2021-01-06 ENCOUNTER — HOSPITAL ENCOUNTER (EMERGENCY)
Age: 41
Discharge: HOME | End: 2021-01-06
Attending: EMERGENCY MEDICINE
Payer: MEDICARE

## 2021-01-06 VITALS
BODY MASS INDEX: 26.41 KG/M2 | HEIGHT: 72 IN | HEART RATE: 86 BPM | DIASTOLIC BLOOD PRESSURE: 84 MMHG | OXYGEN SATURATION: 96 % | TEMPERATURE: 100.2 F | RESPIRATION RATE: 20 BRPM | WEIGHT: 195 LBS | SYSTOLIC BLOOD PRESSURE: 137 MMHG

## 2021-01-06 VITALS
OXYGEN SATURATION: 89 % | WEIGHT: 188.6 LBS | BODY MASS INDEX: 25.55 KG/M2 | RESPIRATION RATE: 18 BRPM | DIASTOLIC BLOOD PRESSURE: 76 MMHG | HEIGHT: 72 IN | TEMPERATURE: 97.2 F | SYSTOLIC BLOOD PRESSURE: 130 MMHG | HEART RATE: 87 BPM

## 2021-01-06 DIAGNOSIS — R09.02 HYPOXIA: Primary | ICD-10-CM

## 2021-01-06 DIAGNOSIS — Z20.822 SUSPECTED COVID-19 VIRUS INFECTION: ICD-10-CM

## 2021-01-06 PROCEDURE — 99284 EMERGENCY DEPT VISIT MOD MDM: CPT

## 2021-01-06 PROCEDURE — 99212 OFFICE O/P EST SF 10 MIN: CPT

## 2021-01-06 PROCEDURE — 71045 X-RAY EXAM CHEST 1 VIEW: CPT

## 2021-01-06 RX ORDER — ALBUTEROL SULFATE 90 UG/1
2 AEROSOL, METERED RESPIRATORY (INHALATION) 4 TIMES DAILY PRN
Qty: 1 INHALER | Refills: 0 | Status: SHIPPED | OUTPATIENT
Start: 2021-01-06 | End: 2021-10-18 | Stop reason: SDUPTHER

## 2021-01-06 RX ORDER — BENZONATATE 100 MG/1
100 CAPSULE ORAL 3 TIMES DAILY PRN
Qty: 30 CAPSULE | Refills: 0 | Status: SHIPPED | OUTPATIENT
Start: 2021-01-06 | End: 2021-01-13

## 2021-01-06 ASSESSMENT — PAIN DESCRIPTION - PAIN TYPE: TYPE: ACUTE PAIN

## 2021-01-06 NOTE — ED PROVIDER NOTES
[address] 69      Pt Name: Juanita Gomez  MRN: [id number]  [place] 1980  Date of evaluation: 1/6/2021      CHIEF COMPLAINT       Chief Complaint   Patient presents with    Shortness of Breath     worsening since symptoms began 4 days ago. Fiance at home is positive COVID-19         HISTORY OF PRESENT ILLNESS      The patient presents with dyspnea for about 4 days. His fiancée was diagnosed with COVID-19. He has not had diarrhea. He denies abdominal pain. He says he has been out of his albuterol inhaler and would like one to be prescribed for him. The patient reports that he does not normally require oxygen. He has not had a productive cough but he would like something for his cough that is giving him a little bit of a headache. Nothing makes his symptoms better or worse otherwise. REVIEW OF SYSTEMS       All systems reviewed and negative unless noted in HPI. The patient has some chills. Denies vision change. Minimal rhinorrhea. Denies any neck pain or stiffness. Recent cough and shortness of breath. No nausea,  vomiting or diarrhea. Denies any dysuria. Denies urinary frequency or hematuria. Denies musculoskeletal injury or pain. Denies any weakness, numbness or focal neurologic deficit. Denies any skin rash or edema. No recent psychiatric issues. No easy bruising or bleeding. Denies any polyuria, polydypsia or history of immunocompromise. PAST MEDICAL HISTORY    has a past medical history of Anxiety, Chronic back pain, Depression, and Irritable bowel syndrome. SURGICAL HISTORY      has a past surgical history that includes Appendectomy; Tonsillectomy; Tympanostomy tube placement; hernia repair; and sinus surgery.     CURRENT MEDICATIONS       Previous Medications    ALBUTEROL SULFATE HFA (VENTOLIN HFA) 108 (90 BASE) MCG/ACT INHALER    Inhale 2 puffs into the lungs every 4 hours as needed for Wheezing BENZONATATE (TESSALON) 200 MG CAPSULE    Take 1 capsule by mouth 3 times daily as needed for Cough    BUDESONIDE-FORMOTEROL (SYMBICORT) 160-4.5 MCG/ACT AERO    Inhale 2 puffs into the lungs 2 times daily    DIAZEPAM (VALIUM) 5 MG TABLET    Take 5 mg by mouth 2 times daily as needed for Anxiety. FLUTICASONE (FLONASE) 50 MCG/ACT NASAL SPRAY    2 sprays by Each Nostril route daily    IBUPROFEN (ADVIL;MOTRIN) 600 MG TABLET    Take 1 tablet by mouth every 6 hours as needed for Pain    METOPROLOL SUCCINATE (TOPROL XL) 25 MG EXTENDED RELEASE TABLET    Take 1 tablet by mouth daily    MIRTAZAPINE (REMERON SOLTAB) 30 MG DISINTEGRATING TABLET    Take 1 tablet by mouth nightly    MONTELUKAST (SINGULAIR) 10 MG TABLET    Take 1 tablet by mouth nightly    NYSTATIN (MYCOSTATIN) 838924 UNIT/ML SUSPENSION    Take 5 mLs by mouth 3 times daily Swish and spit may swallow    PAROXETINE (PAXIL) 30 MG TABLET    Take 30 mg by mouth 2 times daily    RA ALLERGY RELIEF 180 MG TABLET    take 1 tablet by mouth once daily    SUBOXONE 8-2 MG FILM SL FILM           ALLERGIES     is allergic to ciprofloxacin. FAMILY HISTORY     He indicated that his mother is alive. He indicated that his father is alive. He indicated that the status of his sister is unknown. He indicated that only one of his two maternal uncles is alive. He indicated that only one of his two paternal uncles is alive. family history includes Heart Attack in his father, maternal uncle, and paternal uncle; Heart Disease in his father; High Blood Pressure in his mother; Mental Illness in his mother and sister; Other in his father, maternal uncle, mother, and paternal uncle. SOCIAL HISTORY      reports that he has been smoking cigarettes. He has been smoking about 2.00 packs per day. He has quit using smokeless tobacco.  His smokeless tobacco use included chew. He reports that he does not drink alcohol or use drugs.     PHYSICAL EXAM     INITIAL VITALS:  height is 6' (1.829 m) and weight is 195 lb (88.5 kg). His tympanic temperature is 100.2 °F (37.9 °C). His blood pressure is 135/90 (abnormal) and his pulse is 86. His respiration is 20 and oxygen saturation is 95%. The patient is alert and oriented, in no apparent distress. HEENT is atraumatic. Pupils are PERRL at 4 mm. Mucous membranes moist.    Neck is supple with no meningismus. Heart sounds regular rate and rhythm with no gallops, murmurs, or rubs. Lungs clear, no wheezes, rales or rhonchi. Abdomen: soft, nontender with no pain to palpation. No pulsatile mass. Normal bowel sounds are noted. No rebound or guarding. Musculoskeletal exam shows no evidence of trauma. Normal distal pulses in all extremities. Skin: no rash or edema. Neurological exam reveals cranial nerves 2 through 12 grossly intact. Patient has equal  and normal deep tendon reflexes. Psychiatric: appropriate. Lymphatics.:  No lymphadenopathy. DIFFERENTIAL DIAGNOSIS/ MDM:     Hypoxia, Covid 19, pneumonia    DIAGNOSTIC RESULTS         RADIOLOGY:   I reviewed the radiologist interpretations:  XR CHEST PORTABLE   Final Result   New moderate prominence of the interstitial markings which may be related to   edema and/or inflammation              XR CHEST PORTABLE (Final result)  Result time 01/06/21 15:31:11  Final result by Blanco Hernandez MD (01/06/21 15:31:11)                Impression:    New moderate prominence of the interstitial markings which may be related to   edema and/or inflammation             Narrative:    EXAMINATION:   ONE XRAY VIEW OF THE CHEST     1/6/2021 3:16 pm     COMPARISON:   December 11, 2020, chest exam     HISTORY:   ORDERING SYSTEM PROVIDED HISTORY: covid symptoms   TECHNOLOGIST PROVIDED HISTORY:   covid symptoms   Reason for Exam: dyspnea for about 4 days.  His fiancée was diagnosed with   COVID-19.    Acuity: Acute   Type of Exam: Initial     FINDINGS:   Borderline cardiomegaly     Now moderate diffuse prominence/indistinctness of the pulmonary   vascularity/interstitial markings. No significant pleural process                       EMERGENCY DEPARTMENT COURSE:   Vitals:    Vitals:    01/06/21 1454 01/06/21 1459   BP: (!) 135/90    Pulse: 86    Resp: 20    Temp: 100.2 °F (37.9 °C)    TempSrc: Tympanic    SpO2: (!) 87% 95%   Weight: 195 lb (88.5 kg)    Height: 6' (1.829 m)      -------------------------  BP: (!) 135/90, Temp: 100.2 °F (37.9 °C), Pulse: 86, Resp: 20      Re-evaluation Notes    The patient has hypoxia but no obvious pneumonia. I suspect he has COVID-19 based upon his exposure and symptoms. He has responded well to supplemental oxygen via nasal cannula. I have written for him to have oxygen at home. The patient will be discharged. He should isolate there. He is discharged in good condition. FINAL IMPRESSION      1. Hypoxia    2.  Suspected COVID-19 virus infection          DISPOSITION/PLAN   DISPOSITION        Condition on Disposition    good    PATIENT REFERRED TO:  NATALIIA Stanton  [facility]  [phone]    In 1 week  As needed      DISCHARGE MEDICATIONS:  New Prescriptions    ALBUTEROL SULFATE HFA (VENTOLIN HFA) 108 (90 BASE) MCG/ACT INHALER    Inhale 2 puffs into the lungs 4 times daily as needed for Wheezing    BENZONATATE (TESSALON PERLES) 100 MG CAPSULE    Take 1 capsule by mouth 3 times daily as needed for Cough       (Please note that portions of this note were completed with a voice recognition program.  Efforts were made to edit the dictations but occasionally words are mis-transcribed.)    [place] MD   Attending Emergency Physician         Sal Jamil MD  01/06/21 1812

## 2021-01-06 NOTE — PROGRESS NOTES
Patient states that his girlfriend has Enrique. He started having symptoms several days ago. Reports shortness of breath. Pulse ox here in urgent care is 86-89% on room air. Would plan to send to ER for evaluation.

## 2021-01-28 DIAGNOSIS — R05.9 COUGH: ICD-10-CM

## 2021-01-28 DIAGNOSIS — F17.200 SMOKER: ICD-10-CM

## 2021-01-28 RX ORDER — ALBUTEROL SULFATE 90 UG/1
2 AEROSOL, METERED RESPIRATORY (INHALATION) EVERY 4 HOURS PRN
Qty: 1 INHALER | Refills: 5 | Status: SHIPPED | OUTPATIENT
Start: 2021-01-28 | End: 2021-10-18

## 2021-03-11 DIAGNOSIS — Z72.0 TOBACCO USE: Primary | ICD-10-CM

## 2021-03-24 DIAGNOSIS — J30.1 SEASONAL ALLERGIC RHINITIS DUE TO POLLEN: ICD-10-CM

## 2021-03-24 RX ORDER — MONTELUKAST SODIUM 10 MG/1
10 TABLET ORAL NIGHTLY
Qty: 30 TABLET | Refills: 0 | Status: SHIPPED | OUTPATIENT
Start: 2021-03-24 | End: 2021-10-18 | Stop reason: SDUPTHER

## 2021-03-24 NOTE — TELEPHONE ENCOUNTER
Last Appt:  2/4/2021  Next Appt: unable to reach pt to get appt rescheduled. 30 day supply pending.    Med verified in Epic

## 2021-05-03 DIAGNOSIS — R00.0 TACHYCARDIA: ICD-10-CM

## 2021-05-03 DIAGNOSIS — J30.1 SEASONAL ALLERGIC RHINITIS DUE TO POLLEN: ICD-10-CM

## 2021-05-03 RX ORDER — MONTELUKAST SODIUM 10 MG/1
10 TABLET ORAL NIGHTLY
Qty: 30 TABLET | Refills: 0 | OUTPATIENT
Start: 2021-05-03

## 2021-05-03 RX ORDER — METOPROLOL SUCCINATE 25 MG/1
25 TABLET, EXTENDED RELEASE ORAL DAILY
Qty: 30 TABLET | Refills: 3 | OUTPATIENT
Start: 2021-05-03

## 2021-05-10 ENCOUNTER — TELEPHONE (OUTPATIENT)
Dept: FAMILY MEDICINE CLINIC | Age: 41
End: 2021-05-10

## 2021-05-10 NOTE — TELEPHONE ENCOUNTER
NO SHOW, and he was discharged from my previous office in [facility] for repeated no show and unreliability  in prescription usage

## 2021-10-18 ENCOUNTER — OFFICE VISIT (OUTPATIENT)
Dept: FAMILY MEDICINE CLINIC | Age: 41
End: 2021-10-18
Payer: MEDICARE

## 2021-10-18 VITALS
HEIGHT: 71 IN | OXYGEN SATURATION: 97 % | WEIGHT: 181.8 LBS | RESPIRATION RATE: 20 BRPM | DIASTOLIC BLOOD PRESSURE: 62 MMHG | HEART RATE: 96 BPM | BODY MASS INDEX: 25.45 KG/M2 | TEMPERATURE: 99 F | SYSTOLIC BLOOD PRESSURE: 112 MMHG

## 2021-10-18 DIAGNOSIS — F41.8 ANXIETY WITH DEPRESSION: ICD-10-CM

## 2021-10-18 DIAGNOSIS — R00.0 TACHYCARDIA: ICD-10-CM

## 2021-10-18 DIAGNOSIS — J44.9 CHRONIC OBSTRUCTIVE PULMONARY DISEASE, UNSPECIFIED COPD TYPE (HCC): ICD-10-CM

## 2021-10-18 DIAGNOSIS — J32.1 CHRONIC FRONTAL SINUSITIS: Primary | ICD-10-CM

## 2021-10-18 DIAGNOSIS — R79.89 LOW TESTOSTERONE: ICD-10-CM

## 2021-10-18 DIAGNOSIS — J30.1 SEASONAL ALLERGIC RHINITIS DUE TO POLLEN: ICD-10-CM

## 2021-10-18 PROCEDURE — 4004F PT TOBACCO SCREEN RCVD TLK: CPT | Performed by: NURSE PRACTITIONER

## 2021-10-18 PROCEDURE — G8428 CUR MEDS NOT DOCUMENT: HCPCS | Performed by: NURSE PRACTITIONER

## 2021-10-18 PROCEDURE — G8926 SPIRO NO PERF OR DOC: HCPCS | Performed by: NURSE PRACTITIONER

## 2021-10-18 PROCEDURE — 99203 OFFICE O/P NEW LOW 30 MIN: CPT | Performed by: NURSE PRACTITIONER

## 2021-10-18 PROCEDURE — 99213 OFFICE O/P EST LOW 20 MIN: CPT | Performed by: NURSE PRACTITIONER

## 2021-10-18 PROCEDURE — G8484 FLU IMMUNIZE NO ADMIN: HCPCS | Performed by: NURSE PRACTITIONER

## 2021-10-18 PROCEDURE — 3023F SPIROM DOC REV: CPT | Performed by: NURSE PRACTITIONER

## 2021-10-18 PROCEDURE — G8419 CALC BMI OUT NRM PARAM NOF/U: HCPCS | Performed by: NURSE PRACTITIONER

## 2021-10-18 RX ORDER — FLUTICASONE PROPIONATE 50 MCG
2 SPRAY, SUSPENSION (ML) NASAL DAILY
Qty: 1 EACH | Refills: 2 | Status: SHIPPED | OUTPATIENT
Start: 2021-10-18

## 2021-10-18 RX ORDER — MIRTAZAPINE 30 MG/1
30 TABLET, FILM COATED ORAL NIGHTLY
Qty: 30 TABLET | Refills: 2 | Status: CANCELLED | OUTPATIENT
Start: 2021-10-18 | End: 2022-10-18

## 2021-10-18 RX ORDER — CLONIDINE HYDROCHLORIDE 0.3 MG/1
TABLET ORAL
COMMUNITY
End: 2021-12-21

## 2021-10-18 RX ORDER — MONTELUKAST SODIUM 10 MG/1
10 TABLET ORAL NIGHTLY
Qty: 90 TABLET | Refills: 2 | Status: SHIPPED | OUTPATIENT
Start: 2021-10-18

## 2021-10-18 RX ORDER — NALOXONE HYDROCHLORIDE 4 MG/.1ML
SPRAY NASAL
COMMUNITY
Start: 2021-08-19

## 2021-10-18 RX ORDER — TADALAFIL 20 MG/1
20 TABLET ORAL DAILY PRN
Qty: 30 TABLET | Status: CANCELLED | OUTPATIENT
Start: 2021-10-18

## 2021-10-18 RX ORDER — TADALAFIL 20 MG/1
20 TABLET ORAL DAILY PRN
COMMUNITY
Start: 2020-12-16 | End: 2021-12-21 | Stop reason: SDUPTHER

## 2021-10-18 RX ORDER — PAROXETINE 30 MG/1
30 TABLET, FILM COATED ORAL 2 TIMES DAILY
Qty: 90 TABLET | Refills: 1 | Status: SHIPPED | OUTPATIENT
Start: 2021-10-18 | End: 2022-01-13

## 2021-10-18 RX ORDER — DIAZEPAM 5 MG/1
5 TABLET ORAL 2 TIMES DAILY PRN
Status: CANCELLED | OUTPATIENT
Start: 2021-10-18

## 2021-10-18 RX ORDER — ALBUTEROL SULFATE 90 UG/1
2 AEROSOL, METERED RESPIRATORY (INHALATION) 4 TIMES DAILY PRN
Qty: 1 EACH | Refills: 1 | Status: SHIPPED | OUTPATIENT
Start: 2021-10-18

## 2021-10-18 RX ORDER — METOPROLOL SUCCINATE 25 MG/1
25 TABLET, EXTENDED RELEASE ORAL DAILY
Qty: 90 TABLET | Refills: 1 | Status: SHIPPED | OUTPATIENT
Start: 2021-10-18

## 2021-10-18 RX ORDER — CLONIDINE HYDROCHLORIDE 0.3 MG/1
TABLET ORAL
Qty: 60 TABLET | Status: CANCELLED | OUTPATIENT
Start: 2021-10-18

## 2021-10-18 RX ORDER — BUDESONIDE AND FORMOTEROL FUMARATE DIHYDRATE 160; 4.5 UG/1; UG/1
2 AEROSOL RESPIRATORY (INHALATION) 2 TIMES DAILY
Qty: 1 EACH | Refills: 1 | Status: SHIPPED | OUTPATIENT
Start: 2021-10-18

## 2021-10-18 SDOH — ECONOMIC STABILITY: FOOD INSECURITY: WITHIN THE PAST 12 MONTHS, YOU WORRIED THAT YOUR FOOD WOULD RUN OUT BEFORE YOU GOT MONEY TO BUY MORE.: NEVER TRUE

## 2021-10-18 SDOH — ECONOMIC STABILITY: FOOD INSECURITY: WITHIN THE PAST 12 MONTHS, THE FOOD YOU BOUGHT JUST DIDN'T LAST AND YOU DIDN'T HAVE MONEY TO GET MORE.: NEVER TRUE

## 2021-10-18 ASSESSMENT — PATIENT HEALTH QUESTIONNAIRE - PHQ9
8. MOVING OR SPEAKING SO SLOWLY THAT OTHER PEOPLE COULD HAVE NOTICED. OR THE OPPOSITE, BEING SO FIGETY OR RESTLESS THAT YOU HAVE BEEN MOVING AROUND A LOT MORE THAN USUAL: 0
SUM OF ALL RESPONSES TO PHQ QUESTIONS 1-9: 16
9. THOUGHTS THAT YOU WOULD BE BETTER OFF DEAD, OR OF HURTING YOURSELF: 0
5. POOR APPETITE OR OVEREATING: 3
SUM OF ALL RESPONSES TO PHQ QUESTIONS 1-9: 16
7. TROUBLE CONCENTRATING ON THINGS, SUCH AS READING THE NEWSPAPER OR WATCHING TELEVISION: 0
SUM OF ALL RESPONSES TO PHQ9 QUESTIONS 1 & 2: 4
2. FEELING DOWN, DEPRESSED OR HOPELESS: 2
10. IF YOU CHECKED OFF ANY PROBLEMS, HOW DIFFICULT HAVE THESE PROBLEMS MADE IT FOR YOU TO DO YOUR WORK, TAKE CARE OF THINGS AT HOME, OR GET ALONG WITH OTHER PEOPLE: 2
1. LITTLE INTEREST OR PLEASURE IN DOING THINGS: 2
SUM OF ALL RESPONSES TO PHQ QUESTIONS 1-9: 16
3. TROUBLE FALLING OR STAYING ASLEEP: 3
4. FEELING TIRED OR HAVING LITTLE ENERGY: 3
6. FEELING BAD ABOUT YOURSELF - OR THAT YOU ARE A FAILURE OR HAVE LET YOURSELF OR YOUR FAMILY DOWN: 3

## 2021-10-18 ASSESSMENT — COLUMBIA-SUICIDE SEVERITY RATING SCALE - C-SSRS
7. DID THIS OCCUR IN THE LAST THREE MONTHS: NO
6. HAVE YOU EVER DONE ANYTHING, STARTED TO DO ANYTHING, OR PREPARED TO DO ANYTHING TO END YOUR LIFE?: YES
1. WITHIN THE PAST MONTH, HAVE YOU WISHED YOU WERE DEAD OR WISHED YOU COULD GO TO SLEEP AND NOT WAKE UP?: YES
2. HAVE YOU ACTUALLY HAD ANY THOUGHTS OF KILLING YOURSELF?: NO

## 2021-10-18 ASSESSMENT — SOCIAL DETERMINANTS OF HEALTH (SDOH): HOW HARD IS IT FOR YOU TO PAY FOR THE VERY BASICS LIKE FOOD, HOUSING, MEDICAL CARE, AND HEATING?: HARD

## 2021-10-18 NOTE — PROGRESS NOTES
[address] [address]  Dept: [phone]  Dept Fax: [phone]  Loc: [phone]     Visit Date:  10/18/2021    Patient:  Orelia Sicard Croninger  YOB: 1980    HPI:     Chief Complaint   Patient presents with    Hypertension     Anxiety, depression, H/o Substance abuse. Tachycardia- beta blockers. H/o low testosterone. He has had sinus infection on left side for 4 months. He has been treated with 2 different abx. HPI   Anxiety with depression , MGF in hospital and near end of life with delerium, patient tearful    No suicidal thoughts      Hx tachycardia  Hx opiod /pill addiction , sees substance abuse center now for treatment, suboxene and valium currently prescribed there and monthly virtual counseling  History of low testosterone and saw infertility specialist, wishing to see someone locally   COPD, Stable and chronic   Asthma, seasonal allergies, well controlled  Needing refills on inhalers and singular, taking clariton and flonase too    Medications  Prior to Visit Medications    Medication Sig Taking? Authorizing Provider   montelukast (SINGULAIR) 10 MG tablet Take 1 tablet by mouth nightly Yes Norma Mis, DO   albuterol sulfate HFA (VENTOLIN HFA) 108 (90 Base) MCG/ACT inhaler Inhale 2 puffs into the lungs 4 times daily as needed for Wheezing Yes Rona Espinal MD   PARoxetine (PAXIL) 30 MG tablet Take 30 mg by mouth 2 times daily Yes Historical Provider, MD   diazePAM (VALIUM) 5 MG tablet Take 5 mg by mouth 2 times daily as needed for Anxiety.  Yes Historical Provider, MD   fluticasone (FLONASE) 50 MCG/ACT nasal spray 2 sprays by Each Nostril route daily Yes NATALIIA Phillips   budesonide-formoterol [facility]) 160-4.5 MCG/ACT AERO Inhale 2 puffs into the lungs 2 times daily Yes NATALIIA Phillips   mirtazapine (REMERON SOLTAB) 30 MG disintegrating tablet Take 1 tablet by mouth nightly Yes NATALIIA Thomas   metoprolol succinate (TOPROL XL) 25 MG extended release tablet Take 1 tablet by mouth daily Yes NATALIIA Thomas   RA ALLERGY RELIEF 180 MG tablet take 1 tablet by mouth once daily Yes Cheyenne Chen MD   SUBOXONE 8-2 MG FILM SL film  Yes Historical Provider, MD   ibuprofen (ADVIL;MOTRIN) 600 MG tablet Take 1 tablet by mouth every 6 hours as needed for Pain Yes Salomon Cardoso MD   tadalafil (CIALIS) 20 MG tablet Take 20 mg by mouth daily as needed  Patient not taking: Reported on 10/18/2021  Historical Provider, MD   NARCAN 4 MG/0.1ML LIQD nasal spray use as directed AT [address] [place] [address] Provider, MD   cloNIDine (CATAPRES) 0.3 MG tablet TK 1 T PO BID  Patient not taking: Reported on 10/18/2021  Historical Provider, MD   clomiPHENE (CLOMID) 50 MG tablet Take 25 mg by mouth daily  Patient not taking: Reported on 10/18/2021  Historical Provider, MD        The patient is allergic to ciprofloxacin. Past Medical History  Shaneka Jaquez  has a past medical history of Anxiety, Chronic back pain, Depression, and Irritable bowel syndrome. Past Surgical History  The patient  has a past surgical history that includes Appendectomy; Tonsillectomy; Tympanostomy tube placement; hernia repair; and sinus surgery. Family History  This patient's family history includes Heart Attack in his father, maternal uncle, and paternal uncle; Heart Disease in his father; High Blood Pressure in his mother; Mental Illness in his mother and sister; Other in his father, maternal uncle, mother, and paternal uncle. Social History  Shaneka Jaquez  reports that he has been smoking cigarettes. He has been smoking about 2.00 packs per day. He has quit using smokeless tobacco.  His smokeless tobacco use included chew. He reports that he does not drink alcohol and does not use drugs.     Health Maintenance:    Health Maintenance   Topic Date Due    Hepatitis C screen  Never done    Varicella vaccine (1 of 2 - 2-dose childhood series) Never done    Pneumococcal 0-64 years Vaccine (1 of 2 - PPSV23) Never done    Diabetes screen  Never done    DTaP/Tdap/Td vaccine (2 - Td or Tdap) 04/06/2021    Potassium monitoring  12/11/2021    Creatinine monitoring  12/11/2021    Lipid screen  09/01/2025    Flu vaccine  Completed    COVID-19 Vaccine  Completed    HIV screen  Completed    Hepatitis A vaccine  Aged Out    Hepatitis B vaccine  Aged Out    Hib vaccine  Aged Out    Meningococcal (ACWY) vaccine  Aged Out       Subjective:      Review of Systems   Constitutional: Negative for chills, fatigue and fever. HENT: Positive for postnasal drip. Negative for congestion, sinus pressure and sinus pain. Respiratory: Negative for cough, shortness of breath and wheezing. Cardiovascular: Negative for chest pain, palpitations and leg swelling. Gastrointestinal: Negative for abdominal pain. Genitourinary: Negative for difficulty urinating. Musculoskeletal: Negative for arthralgias, back pain and gait problem. Skin: Negative for pallor and rash. Neurological: Negative for dizziness. Psychiatric/Behavioral: Negative for agitation, dysphoric mood, sleep disturbance and suicidal ideas. The patient is nervous/anxious. Objective:     /62 (Site: Right Upper Arm, Position: Sitting, Cuff Size: Medium Adult)   Pulse 96   Temp 99 °F (37.2 °C)   Resp 20   Ht 5' 11.1\" (1.806 m)   Wt 181 lb 12.8 oz (82.5 kg)   SpO2 97%   BMI 25.28 kg/m²     Physical Exam  Vitals and nursing note reviewed. Constitutional:       Appearance: Normal appearance. HENT:      Head: Normocephalic. Right Ear: Tympanic membrane, ear canal and external ear normal.      Left Ear: Tympanic membrane, ear canal and external ear normal.      Nose: No congestion or rhinorrhea. Mouth/Throat:      Pharynx: No oropharyngeal exudate or posterior oropharyngeal erythema.    Eyes: Conjunctiva/sclera: Conjunctivae normal.   Cardiovascular:      Rate and Rhythm: Normal rate and regular rhythm. Heart sounds: Normal heart sounds, S1 normal and S2 normal. No murmur heard. Pulmonary:      Effort: Pulmonary effort is normal. No respiratory distress. Breath sounds: Normal breath sounds. No wheezing. Abdominal:      General: Bowel sounds are normal.      Palpations: Abdomen is soft. Musculoskeletal:      Right lower leg: No edema. Left lower leg: No edema. Skin:     General: Skin is warm. Capillary Refill: Capillary refill takes less than 2 seconds. Neurological:      General: No focal deficit present. Mental Status: He is alert. Psychiatric:         Attention and Perception: Attention normal.         Mood and Affect: Mood normal.         Behavior: Behavior normal. Behavior is cooperative. Thought Content: Thought content normal.         Judgment: Judgment normal.         Labs Reviewed 10/18/2021:    Lab Results   Component Value Date    WBC 16.5 (H) 12/14/2020    HGB 15.7 12/14/2020    HCT 48.6 12/14/2020     12/14/2020    HDL 34 (L) 09/01/2020    ALT 16 12/11/2020    AST 18 12/11/2020     12/11/2020    K 4.2 12/11/2020     12/11/2020    CREATININE 0.86 12/11/2020    BUN 6 12/11/2020    CO2 27 12/11/2020    TSH 0.93 09/01/2020       Assessment/Plan      1. Seasonal allergic rhinitis due to pollen  Avoid triggers and environmental allergens  - montelukast (SINGULAIR) 10 MG tablet; Take 1 tablet by mouth nightly  Dispense: 30 tablet; Refill: 0  - fluticasone (FLONASE) 50 MCG/ACT nasal spray; 2 sprays by Each Nostril route daily    2. Anxiety with depression  Patient advised to discuss remeron with psychology for insomnia and depression   - mirtazapine (REMERON SOLTAB) 30 MG disintegrating tablet; Take 1 tablet by mouth nightly  Dispense: 30 tablet; Refill: 2    3.  Tachycardia    - metoprolol succinate (TOPROL XL) 25 MG extended release tablet; Take 1 tablet by mouth daily  Dispense: 30 tablet; Refill: 3    4. Chronic obstructive pulmonary disease, unspecified COPD type (HCC)    - fluticasone (FLONASE) 50 MCG/ACT nasal spray; 2 sprays by Each Nostril route daily  - budesonide-formoterol (SYMBICORT) 160-4.5 MCG/ACT AERO; Inhale 2 puffs into the lungs 2 times daily    5. Chronic sinus congestion   Referral to ENT now   - fluticasone (FLONASE) 50 MCG/ACT nasal spray; 2 sprays by Each Nostril route daily      Return in about 2 months (around 12/18/2021). >45 min spent reviewing old charting and patient history, time spent on education and care plans       Patient given educational materials - see patient instructions. Discussed use, benefit, and side effects of prescribed medications. All patient questions answered. Pt voiced understanding. Reviewed health maintenance.        Electronically signed BIRGIT Klein CNP on 10/18/2021 at 12:11 PM

## 2021-10-19 PROBLEM — R00.0 TACHYCARDIA: Status: ACTIVE | Noted: 2021-10-19

## 2021-10-19 PROBLEM — R79.89 LOW TESTOSTERONE: Status: ACTIVE | Noted: 2021-10-19

## 2021-10-19 PROBLEM — J32.1 CHRONIC FRONTAL SINUSITIS: Status: ACTIVE | Noted: 2021-10-19

## 2021-10-19 PROBLEM — F41.8 ANXIETY WITH DEPRESSION: Status: ACTIVE | Noted: 2021-10-19

## 2021-10-19 PROBLEM — J44.9 CHRONIC OBSTRUCTIVE PULMONARY DISEASE (HCC): Status: ACTIVE | Noted: 2021-10-19

## 2021-10-19 PROBLEM — J30.1 SEASONAL ALLERGIC RHINITIS DUE TO POLLEN: Status: ACTIVE | Noted: 2021-10-19

## 2021-10-19 ASSESSMENT — ENCOUNTER SYMPTOMS
COUGH: 0
BACK PAIN: 0
SHORTNESS OF BREATH: 0
SINUS PAIN: 0
ABDOMINAL PAIN: 0
SINUS PRESSURE: 0
WHEEZING: 0

## 2021-12-08 DIAGNOSIS — F41.8 ANXIETY WITH DEPRESSION: ICD-10-CM

## 2021-12-08 NOTE — TELEPHONE ENCOUNTER
----- Message from Jesu Gee sent at 12/8/2021 12:06 PM EST -----  Subject: Refill Request    QUESTIONS  Name of Medication? Other - Remeron  Patient-reported dosage and instructions? 30MG 1 tablet at night   How many days do you have left? 3  Preferred Pharmacy? [address] phone number (if available)? [phone]  ---------------------------------------------------------------------------  --------------  CALL BACK INFO  What is the best way for the office to contact you? OK to leave message on   voicemail, OK to respond with electronic message via [employer] portal (only   for patients who have registered [employer] account)  Preferred Call Back Phone Number?  [phone]

## 2021-12-13 RX ORDER — MIRTAZAPINE 30 MG/1
30 TABLET, FILM COATED ORAL NIGHTLY
Qty: 30 TABLET | Refills: 2 | Status: SHIPPED | OUTPATIENT
Start: 2021-12-13 | End: 2021-12-17 | Stop reason: SDUPTHER

## 2021-12-13 RX ORDER — MIRTAZAPINE 30 MG/1
30 TABLET, FILM COATED ORAL NIGHTLY
Qty: 30 TABLET | Refills: 5 | OUTPATIENT
Start: 2021-12-13

## 2021-12-13 RX ORDER — MIRTAZAPINE 30 MG/1
30 TABLET, FILM COATED ORAL NIGHTLY
COMMUNITY
End: 2021-12-13 | Stop reason: SDUPTHER

## 2021-12-13 NOTE — TELEPHONE ENCOUNTER
This was not on current med list, I called patient he has said he has continued to take.      Dudley Klein is requesting a refill on the following medication(s):  Requested Prescriptions     Pending Prescriptions Disp Refills    mirtazapine (REMERON) 30 MG tablet 30 tablet 5     Sig: Take 1 tablet by mouth nightly       Last Visit Date (If Applicable):  92/02/6560    Next Visit Date:    12/21/2021

## 2021-12-17 RX ORDER — MIRTAZAPINE 30 MG/1
30 TABLET, FILM COATED ORAL NIGHTLY
Qty: 30 TABLET | Refills: 2 | Status: SHIPPED | OUTPATIENT
Start: 2021-12-17 | End: 2021-12-21 | Stop reason: ALTCHOICE

## 2021-12-21 ENCOUNTER — OFFICE VISIT (OUTPATIENT)
Dept: FAMILY MEDICINE CLINIC | Age: 41
End: 2021-12-21
Payer: MEDICARE

## 2021-12-21 VITALS
SYSTOLIC BLOOD PRESSURE: 126 MMHG | TEMPERATURE: 98 F | BODY MASS INDEX: 27.33 KG/M2 | HEART RATE: 87 BPM | OXYGEN SATURATION: 95 % | WEIGHT: 195.2 LBS | RESPIRATION RATE: 20 BRPM | HEIGHT: 71 IN | DIASTOLIC BLOOD PRESSURE: 82 MMHG

## 2021-12-21 DIAGNOSIS — F41.8 ANXIETY WITH DEPRESSION: ICD-10-CM

## 2021-12-21 DIAGNOSIS — J32.1 CHRONIC FRONTAL SINUSITIS: ICD-10-CM

## 2021-12-21 PROCEDURE — 99212 OFFICE O/P EST SF 10 MIN: CPT | Performed by: NURSE PRACTITIONER

## 2021-12-21 PROCEDURE — 99213 OFFICE O/P EST LOW 20 MIN: CPT | Performed by: NURSE PRACTITIONER

## 2021-12-21 RX ORDER — CLONAZEPAM 1 MG/1
1 TABLET ORAL 2 TIMES DAILY PRN
Qty: 60 TABLET | Refills: 2 | Status: SHIPPED | OUTPATIENT
Start: 2021-12-21 | End: 2022-01-11 | Stop reason: DRUGHIGH

## 2021-12-21 RX ORDER — TADALAFIL 20 MG/1
20 TABLET ORAL DAILY PRN
Qty: 30 TABLET | Refills: 1 | Status: SHIPPED | OUTPATIENT
Start: 2021-12-21

## 2021-12-21 RX ORDER — SULFAMETHOXAZOLE AND TRIMETHOPRIM 800; 160 MG/1; MG/1
1 TABLET ORAL 2 TIMES DAILY
Qty: 20 TABLET | Refills: 0 | Status: SHIPPED | OUTPATIENT
Start: 2021-12-21 | End: 2021-12-31

## 2021-12-21 RX ORDER — CLONAZEPAM 1 MG/1
1 TABLET ORAL 2 TIMES DAILY PRN
Qty: 60 TABLET | Refills: 2 | Status: SHIPPED | OUTPATIENT
Start: 2021-12-21 | End: 2022-01-11

## 2021-12-21 RX ORDER — 1.1% SODIUM FLUORIDE PRESCRIPTION DENTAL CREAM 5 MG/G
CREAM DENTAL
COMMUNITY
Start: 2021-10-18

## 2021-12-21 ASSESSMENT — PATIENT HEALTH QUESTIONNAIRE - PHQ9
SUM OF ALL RESPONSES TO PHQ QUESTIONS 1-9: 1
1. LITTLE INTEREST OR PLEASURE IN DOING THINGS: 0
SUM OF ALL RESPONSES TO PHQ QUESTIONS 1-9: 1
SUM OF ALL RESPONSES TO PHQ9 QUESTIONS 1 & 2: 1
2. FEELING DOWN, DEPRESSED OR HOPELESS: 1
SUM OF ALL RESPONSES TO PHQ QUESTIONS 1-9: 1

## 2021-12-21 NOTE — PROGRESS NOTES
Jessica [address]  [address] [place] [address]  Dept: [phone]  Dept Fax: [phone]  Loc: [phone]     Visit Date:  12/21/2021    Patient:  Umberto Beauchamp  YOB: 1980    HPI:     Chief Complaint   Patient presents with    Hypertension     Anxiety, Insomnia, COPD- 2 month f/u. He has questions re: medications (Valium, Toprol, Cialis). He has appt with Urologist but has not heard from ENT - he is still having sinus issues. He has seen Dr Garry Cortez in the past.      Sinus congestion chronic  HPI  1-2 weeks worsening and wishing to trial antibx  Insomnia somewhat improved    Anxiety chronic and lifelong , unchanged  Depression, chronic and improving   Medications  Prior to Visit Medications    Medication Sig Taking?  Authorizing Provider   SF 5000 PLUS 1.1 % CREA APPLY WITH TOOTHBRUSH AT BEDTIME AFTER REGULAR BRUSHING, DO NOT RINSE Yes Historical Provider, MD   mirtazapine (REMERON) 30 MG tablet Take 1 tablet by mouth nightly Yes BIRGIT Orozco CNP   NARCAN 4 MG/0.1ML LIQD nasal spray use as directed AT [address] [place] [address] Yes Historical Provider, MD   montelukast (SINGULAIR) 10 MG tablet Take 1 tablet by mouth nightly Yes BIRGIT Orozco CNP   metoprolol succinate (TOPROL XL) 25 MG extended release tablet Take 1 tablet by mouth daily Yes BIRGIT Orozco CNP   fluticasone (FLONASE) 50 MCG/ACT nasal spray 2 sprays by Each Nostril route daily Yes BIRGIT Orozco CNP   albuterol sulfate HFA (VENTOLIN HFA) 108 (90 Base) MCG/ACT inhaler Inhale 2 puffs into the lungs 4 times daily as needed for Wheezing Yes BIRGIT Orozco CNP   budesonide-formoterol (SYMBICORT) 160-4.5 MCG/ACT AERO Inhale 2 puffs into the lungs 2 times daily Yes BIRGIT Orozco CNP   PARoxetine (PAXIL) 30 MG tablet Take 1 tablet by mouth 2 times daily Yes BIRGIT Orozco CNP   diazePAM (VALIUM) 5 MG tablet Take 5 mg by mouth 2 times daily as needed for Anxiety. Yes Historical Provider, MD   RA ALLERGY RELIEF 180 MG tablet take 1 tablet by mouth once daily Yes Piil Catalan MD   tadalafil (CIALIS) 20 MG tablet Take 20 mg by mouth daily as needed  Patient not taking: Reported on 10/18/2021  Historical Provider, MD   SUBOXONE 8-2 MG FILM SL film   Historical Provider, MD        The patient is allergic to ciprofloxacin. Past Medical History  Katy Greenwood  has a past medical history of Anxiety, Chronic back pain, Depression, and Irritable bowel syndrome. Past Surgical History  The patient  has a past surgical history that includes Appendectomy; Tonsillectomy; Tympanostomy tube placement; hernia repair; and sinus surgery. Family History  This patient's family history includes Heart Attack in his father, maternal uncle, and paternal uncle; Heart Disease in his father; High Blood Pressure in his mother; Mental Illness in his mother and sister; Other in his father, maternal uncle, mother, and paternal uncle. Social History  Katy Greenwood  reports that he has been smoking cigarettes. He has been smoking about 2.00 packs per day. He has quit using smokeless tobacco.  His smokeless tobacco use included chew. He reports that he does not drink alcohol and does not use drugs.     Health Maintenance:    Health Maintenance   Topic Date Due    Hepatitis C screen  Never done    Varicella vaccine (1 of 2 - 2-dose childhood series) Never done    Pneumococcal 0-64 years Vaccine (1 of 2 - PPSV23) Never done    Diabetes screen  Never done    DTaP/Tdap/Td vaccine (2 - Td or Tdap) 04/06/2021    Potassium monitoring  12/11/2021    Creatinine monitoring  12/11/2021    COVID-19 Vaccine (3 - Booster for Pfizer series) 02/10/2022    Depression Monitoring  12/21/2022    Lipid screen  09/01/2025    Flu vaccine  Completed    HIV screen  Completed    Hepatitis A vaccine  Aged Out    Hepatitis B vaccine  Aged Out    Hib vaccine  Aged Out  Meningococcal (ACWY) vaccine  Aged Out       Subjective:      Review of Systems   Constitutional: Negative for chills, fatigue and fever. HENT: Negative for congestion. Respiratory: Negative for cough, shortness of breath and wheezing. Cardiovascular: Negative for chest pain, palpitations and leg swelling. Gastrointestinal: Negative for abdominal pain. Genitourinary: Negative for difficulty urinating. Musculoskeletal: Negative for arthralgias. Skin: Negative for pallor. Neurological: Negative for dizziness. Psychiatric/Behavioral: Negative for agitation, hallucinations, self-injury and suicidal ideas. The patient is hyperactive. The patient is not nervous/anxious. Objective:     /82 (Site: Right Upper Arm, Position: Sitting, Cuff Size: Medium Adult)   Pulse 87   Temp 98 °F (36.7 °C)   Resp 20   Ht 5' 10.8\" (1.798 m)   Wt 195 lb 3.2 oz (88.5 kg)   SpO2 95%   BMI 27.38 kg/m²     Physical Exam  Vitals and nursing note reviewed. Constitutional:       Appearance: Normal appearance. He is normal weight. HENT:      Head: Normocephalic. Cardiovascular:      Rate and Rhythm: Normal rate and regular rhythm. Heart sounds: S1 normal and S2 normal. No murmur heard. Pulmonary:      Effort: Pulmonary effort is normal.      Breath sounds: Normal breath sounds. No wheezing or rales. Abdominal:      General: Bowel sounds are normal.      Palpations: Abdomen is soft. Tenderness: There is no abdominal tenderness. There is no guarding. Musculoskeletal:      Right lower leg: No edema. Left lower leg: No edema. Skin:     General: Skin is warm. Neurological:      Mental Status: He is alert. Psychiatric:         Attention and Perception: Attention normal.         Mood and Affect: Mood normal.         Behavior: Behavior normal. Behavior is cooperative. Thought Content:  Thought content normal.         Judgment: Judgment normal.         Labs Reviewed 12/21/2021:    Lab Results   Component Value Date    WBC 16.5 (H) 12/14/2020    HGB 15.7 12/14/2020    HCT 48.6 12/14/2020     12/14/2020    HDL 34 (L) 09/01/2020    ALT 16 12/11/2020    AST 18 12/11/2020     12/11/2020    K 4.2 12/11/2020     12/11/2020    CREATININE 0.86 12/11/2020    BUN 6 12/11/2020    CO2 27 12/11/2020    TSH 0.93 09/01/2020       Assessment/Plan      1. Anxiety with depression  Referral to counseling for mental health eval and tx      - clonazePAM (KLONOPIN) 1 MG tablet; Take 1 tablet by mouth 2 times daily as needed for Anxiety for up to 90 days. Dispense: 60 tablet; Refill: 2  - clonazePAM (KLONOPIN) 1 MG tablet; Take 1 tablet by mouth 2 times daily as needed for Anxiety for up to 90 days. Dispense: 60 tablet; Refill: 2    2. Chronic frontal sinusitis  Antibiotics as prescribed and then on to ENT for eval and tx   - External Referral To ENT      Return in about 1 month (around 1/21/2022), or if symptoms worsen or fail to improve. Patient given educational materials - see patient instructions. Discussed use, benefit, and side effects of prescribed medications. All patient questions answered. Pt voiced understanding. Reviewed health maintenance.        Electronically signed BIRGIT Sewell CNP on 12/21/2021 at 4:07 PM

## 2021-12-27 ENCOUNTER — TELEPHONE (OUTPATIENT)
Dept: FAMILY MEDICINE CLINIC | Age: 41
End: 2021-12-27

## 2022-01-10 ASSESSMENT — ENCOUNTER SYMPTOMS
ABDOMINAL PAIN: 0
SHORTNESS OF BREATH: 0
WHEEZING: 0
COUGH: 0

## 2022-01-11 ENCOUNTER — OFFICE VISIT (OUTPATIENT)
Dept: FAMILY MEDICINE CLINIC | Age: 42
End: 2022-01-11
Payer: MEDICARE

## 2022-01-11 ENCOUNTER — HOSPITAL ENCOUNTER (OUTPATIENT)
Age: 42
Setting detail: SPECIMEN
Discharge: HOME | End: 2022-01-11

## 2022-01-11 VITALS
HEART RATE: 105 BPM | TEMPERATURE: 98 F | BODY MASS INDEX: 28.42 KG/M2 | SYSTOLIC BLOOD PRESSURE: 132 MMHG | RESPIRATION RATE: 20 BRPM | OXYGEN SATURATION: 95 % | DIASTOLIC BLOOD PRESSURE: 70 MMHG | WEIGHT: 202.6 LBS

## 2022-01-11 DIAGNOSIS — F41.8 ANXIETY WITH DEPRESSION: ICD-10-CM

## 2022-01-11 DIAGNOSIS — J01.91 ACUTE RECURRENT SINUSITIS, UNSPECIFIED LOCATION: ICD-10-CM

## 2022-01-11 DIAGNOSIS — J01.91 ACUTE RECURRENT SINUSITIS, UNSPECIFIED LOCATION: Primary | ICD-10-CM

## 2022-01-11 PROCEDURE — G8484 FLU IMMUNIZE NO ADMIN: HCPCS | Performed by: NURSE PRACTITIONER

## 2022-01-11 PROCEDURE — G8427 DOCREV CUR MEDS BY ELIG CLIN: HCPCS | Performed by: NURSE PRACTITIONER

## 2022-01-11 PROCEDURE — U0005 INFEC AGEN DETEC AMPLI PROBE: HCPCS

## 2022-01-11 PROCEDURE — 4004F PT TOBACCO SCREEN RCVD TLK: CPT | Performed by: NURSE PRACTITIONER

## 2022-01-11 PROCEDURE — U0003 INFECTIOUS AGENT DETECTION BY NUCLEIC ACID (DNA OR RNA); SEVERE ACUTE RESPIRATORY SYNDROME CORONAVIRUS 2 (SARS-COV-2) (CORONAVIRUS DISEASE [COVID-19]), AMPLIFIED PROBE TECHNIQUE, MAKING USE OF HIGH THROUGHPUT TECHNOLOGIES AS DESCRIBED BY CMS-2020-01-R: HCPCS

## 2022-01-11 PROCEDURE — 99213 OFFICE O/P EST LOW 20 MIN: CPT | Performed by: NURSE PRACTITIONER

## 2022-01-11 PROCEDURE — G8419 CALC BMI OUT NRM PARAM NOF/U: HCPCS | Performed by: NURSE PRACTITIONER

## 2022-01-11 RX ORDER — CLONAZEPAM 1 MG/1
1 TABLET ORAL 3 TIMES DAILY PRN
Qty: 60 TABLET | Refills: 1 | Status: SHIPPED | OUTPATIENT
Start: 2022-01-11 | End: 2022-01-11

## 2022-01-11 RX ORDER — CLONAZEPAM 1 MG/1
1 TABLET ORAL 3 TIMES DAILY PRN
Qty: 75 TABLET | Refills: 2 | Status: SHIPPED | OUTPATIENT
Start: 2022-01-11 | End: 2022-04-11

## 2022-01-11 RX ORDER — CLONAZEPAM 1 MG/1
1 TABLET ORAL 3 TIMES DAILY PRN
Qty: 60 TABLET | Refills: 1 | Status: CANCELLED | OUTPATIENT
Start: 2022-01-11 | End: 2022-05-26

## 2022-01-11 ASSESSMENT — ENCOUNTER SYMPTOMS
SINUS PAIN: 1
SINUS PRESSURE: 1
WHEEZING: 0
SHORTNESS OF BREATH: 0
COUGH: 0
SORE THROAT: 1
APNEA: 0
ABDOMINAL PAIN: 0

## 2022-01-11 NOTE — PROGRESS NOTES
[address] [place] [address] [facility] [address]  Dept: [phone]  Dept Fax: [phone]  Loc: [phone]     Visit Date:  1/11/2022    Patient:  Nigel Beauchamp  YOB: 1980    HPI:     Chief Complaint   Patient presents with    Hypertension     Anxiety- new start Klonopin- it is not controlling anxiety. Patient also seen 12/21/2021 for sinusitis. He is now having symptoms on both sides. He stopped the Paxil and Remeron. He says it got worse since finishing abx. HPI  Anxiety worsening, ex-girlfriend pregnant with baby of unknown father, claims his \"black maling me\"  History of drug abuse and klonopin is helping with anxiety and insomnia somewhat, patient states he misunderstood and stopped paxil, ok with restarting it     Sinus congestion, no relief from antibx and seems more acutely ill today and sinus pressure, runny nose, no congestion , no SOB,       Medications  Prior to Visit Medications    Medication Sig Taking? Authorizing Provider   SF 5000 PLUS 1.1 % CREA APPLY WITH TOOTHBRUSH AT BEDTIME AFTER REGULAR BRUSHING, DO NOT RINSE  Historical Provider, MD   tadalafil (CIALIS) 20 MG tablet Take 1 tablet by mouth daily as needed for Erectile Dysfunction  BIRGIT Jackson CNP   clonazePAM (KLONOPIN) 1 MG tablet Take 1 tablet by mouth 2 times daily as needed for Anxiety for up to 90 days. BIRGIT Jackson CNP   clonazePAM (KLONOPIN) 1 MG tablet Take 1 tablet by mouth 2 times daily as needed for Anxiety for up to 90 days.   BIRGIT Jackson CNP   NARCAN 4 MG/0.1ML LIQD nasal spray use as directed AT [facility] Provider, MD   montelukast (SINGULAIR) 10 MG tablet Take 1 tablet by mouth nightly  BIRGIT Jackson CNP   metoprolol succinate (TOPROL XL) 25 MG extended release tablet Take 1 tablet by mouth daily  BIRGIT Jackson CNP   fluticasone (FLONASE) 50 MCG/ACT nasal spray 2 sprays by Each Nostril route daily  ElaBIRGIT Alarcon CNP   albuterol sulfate HFA (VENTOLIN HFA) 108 (90 Base) MCG/ACT inhaler Inhale 2 puffs into the lungs 4 times daily as needed for Wheezing  BIRGIT Hernandez CNP   budesonide-formoterol (SYMBICORT) 160-4.5 MCG/ACT AERO Inhale 2 puffs into the lungs 2 times daily  BIRGIT Hernandez CNP   PARoxetine (PAXIL) 30 MG tablet Take 1 tablet by mouth 2 times daily  BIRGIT Hernandez CNP   RA ALLERGY RELIEF 180 MG tablet take 1 tablet by mouth once daily  Casper Lombardi MD        The patient is allergic to ciprofloxacin. Past Medical History  Serena Toney  has a past medical history of Anxiety, Chronic back pain, Depression, and Irritable bowel syndrome. Past Surgical History  The patient  has a past surgical history that includes Appendectomy; Tonsillectomy; Tympanostomy tube placement; hernia repair; and sinus surgery. Family History  This patient's family history includes Heart Attack in his father, maternal uncle, and paternal uncle; Heart Disease in his father; High Blood Pressure in his mother; Mental Illness in his mother and sister; Other in his father, maternal uncle, mother, and paternal uncle. Social History  Serena Toney  reports that he has been smoking cigarettes. He has been smoking about 2.00 packs per day. He has quit using smokeless tobacco.  His smokeless tobacco use included chew. He reports that he does not drink alcohol and does not use drugs.     Health Maintenance:    Health Maintenance   Topic Date Due    Hepatitis C screen  Never done    Varicella vaccine (1 of 2 - 2-dose childhood series) Never done    Pneumococcal 0-64 years Vaccine (1 of 2 - PPSV23) Never done    Diabetes screen  Never done    DTaP/Tdap/Td vaccine (2 - Td or Tdap) 04/06/2021    Potassium monitoring  12/11/2021    Creatinine monitoring  12/11/2021    COVID-19 Vaccine (3 - Booster for Pfizer series) 02/10/2022    Depression Monitoring 12/21/2022    Lipid screen  09/01/2025    Flu vaccine  Completed    HIV screen  Completed    Hepatitis A vaccine  Aged Out    Hepatitis B vaccine  Aged Out    Hib vaccine  Aged Out    Meningococcal (ACWY) vaccine  Aged Out       Subjective:      Review of Systems   Constitutional: Negative for chills, fatigue and fever. HENT: Positive for sinus pressure, sinus pain, sneezing and sore throat. Negative for congestion. Respiratory: Negative for apnea, cough, shortness of breath and wheezing. Cardiovascular: Negative for chest pain, palpitations and leg swelling. Gastrointestinal: Negative for abdominal pain. Neurological: Negative for dizziness. Objective: There were no vitals taken for this visit. Physical Exam  Vitals and nursing note reviewed. Constitutional:       Appearance: Normal appearance. HENT:      Head: Normocephalic. Right Ear: Tympanic membrane and external ear normal.      Left Ear: Tympanic membrane and external ear normal.      Nose: Congestion and rhinorrhea present. Eyes:      Conjunctiva/sclera: Conjunctivae normal.   Cardiovascular:      Rate and Rhythm: Normal rate and regular rhythm. Heart sounds: Normal heart sounds, S1 normal and S2 normal. No murmur heard. Pulmonary:      Effort: Pulmonary effort is normal.      Breath sounds: No wheezing, rhonchi or rales. Abdominal:      General: Bowel sounds are normal.      Palpations: Abdomen is soft. Musculoskeletal:      Right lower leg: No edema. Left lower leg: No edema. Skin:     General: Skin is warm. Neurological:      Mental Status: He is alert. Psychiatric:         Attention and Perception: Attention normal.         Behavior: Behavior is cooperative.          Judgment: Judgment normal.         Labs Reviewed 1/11/2022:    Lab Results   Component Value Date    WBC 16.5 (H) 12/14/2020    HGB 15.7 12/14/2020    HCT 48.6 12/14/2020     12/14/2020    HDL 34 (L) 09/01/2020    ALT 16 12/11/2020    AST 18 12/11/2020     12/11/2020    K 4.2 12/11/2020     12/11/2020    CREATININE 0.86 12/11/2020    BUN 6 12/11/2020    CO2 27 12/11/2020    TSH 0.93 09/01/2020       Assessment/Plan      There are no diagnoses linked to this encounter. No follow-ups on file. URI testing for COVID  Education provided on what is known about the COVID-19 virus. Encouraged quarantining from asymptomatic family members and isolating from community 10 to 14 days. Follow all health department guidelines. Isolation and quarantine defined    Enouraged self-care with an emphasis on hydration, nutritional intakes, small frequent meals, consider protein shakes, rest, humid showers, pumping calfs and short walks, cough and deep breathe    Additionally disinfecting home with soap and warm diluted bleach water    If symptoms persist worsen and unable to care for self at home or accompanied by severe chest pain and shortness of breath::: to the ER immediatley    Anxiety ok to increase to klonopin TID PRN with readdition of paxil once again, avinash in 4 weeks and continue outpatient counseling       Patient given educational materials - see patient instructions. Discussed use, benefit, and side effects of prescribed medications. All patient questions answered. Pt voiced understanding. Reviewed health maintenance.        Electronically signed BIRGIT Dobson CNP on 1/11/2022 at 3:48 PM

## 2022-01-12 NOTE — TELEPHONE ENCOUNTER
----- Message from Benito Larsen sent at 1/12/2022  2:53 PM EST -----  Subject: Message to Provider    QUESTIONS  Information for Provider? Pt said he was in yesterday and has a   sinus/chest cold. He said that PCP was going to call in an antibiotic for   him but the pharmacy doesn't have anything. He said they got his other 2   prescriptions but not that. [facility] in [place]. Please advise.  ---------------------------------------------------------------------------  --------------  CALL BACK INFO  What is the best way for the office to contact you? OK to leave message on   voicemail  Preferred Call Back Phone Number? [phone]  ---------------------------------------------------------------------------  --------------  SCRIPT ANSWERS  Relationship to Patient?  Self

## 2022-01-13 RX ORDER — PAROXETINE 30 MG/1
TABLET, FILM COATED ORAL
Qty: 90 TABLET | Refills: 1 | Status: SHIPPED | OUTPATIENT
Start: 2022-01-13

## 2022-01-16 LAB
SARS-COV-2: NORMAL
SARS-COV-2: NOT DETECTED
SOURCE: NORMAL

## 2022-01-24 ENCOUNTER — TELEPHONE (OUTPATIENT)
Dept: FAMILY MEDICINE CLINIC | Age: 42
End: 2022-01-24

## 2022-01-24 NOTE — TELEPHONE ENCOUNTER
Pt of Juan Manuel Gonzalez, would be able to sign the death certificate so they can creamate him asap. Please let us know either way.  [phone]

## 2022-01-24 NOTE — TELEPHONE ENCOUNTER
He passed away 1/21/2022 at [age]- dx Hepatic Encephalopathy in [place]. They are dropping off Certificate. We are to let them know if we cannot sign.